# Patient Record
Sex: FEMALE | Race: WHITE | Employment: OTHER | URBAN - METROPOLITAN AREA
[De-identification: names, ages, dates, MRNs, and addresses within clinical notes are randomized per-mention and may not be internally consistent; named-entity substitution may affect disease eponyms.]

---

## 2017-02-04 ENCOUNTER — APPOINTMENT (OUTPATIENT)
Dept: LAB | Age: 67
End: 2017-02-04
Attending: FAMILY MEDICINE
Payer: COMMERCIAL

## 2017-02-04 DIAGNOSIS — M81.0 SENILE OSTEOPOROSIS: ICD-10-CM

## 2017-02-04 LAB
25-HYDROXYVITAMIN D (TOTAL): 39.1 NG/ML (ref 30–100)
BUN BLD-MCNC: 16 MG/DL (ref 8–20)
CALCIUM BLD-MCNC: 9.4 MG/DL (ref 8.3–10.3)
CHLORIDE: 106 MMOL/L (ref 101–111)
CO2: 28 MMOL/L (ref 22–32)
CREAT BLD-MCNC: 1.12 MG/DL (ref 0.55–1.02)
GLUCOSE BLD-MCNC: 136 MG/DL (ref 70–99)
POTASSIUM SERPL-SCNC: 4.4 MMOL/L (ref 3.6–5.1)
SODIUM SERPL-SCNC: 139 MMOL/L (ref 136–144)

## 2017-02-04 PROCEDURE — 82306 VITAMIN D 25 HYDROXY: CPT

## 2017-02-04 PROCEDURE — 36415 COLL VENOUS BLD VENIPUNCTURE: CPT

## 2017-02-04 PROCEDURE — 80048 BASIC METABOLIC PNL TOTAL CA: CPT

## 2017-02-06 NOTE — PROGRESS NOTES
Quick Note:    Telephone Information:  Mobile [phone]    LVM of Dr. Judith Arndt result note. To call if any questions.  Ok per [employer].           ______

## 2017-02-14 ENCOUNTER — OFFICE VISIT (OUTPATIENT)
Dept: HEMATOLOGY/ONCOLOGY | Facility: HOSPITAL | Age: 67
End: 2017-02-14
Attending: INTERNAL MEDICINE
Payer: COMMERCIAL

## 2017-02-14 DIAGNOSIS — M81.0 SENILE OSTEOPOROSIS: Primary | ICD-10-CM

## 2017-02-14 PROCEDURE — 96372 THER/PROPH/DIAG INJ SC/IM: CPT

## 2017-02-14 NOTE — PROGRESS NOTES
Education Record    Learner:  Patient    Disease / Diagnosis:Senile osteoporosis    Barriers / Limitations:  None   Comments:    Method:  Brief focused   Comments:    General Topics:  Infection, Medication, Pain, Precautions, Procedure, Side effects and sy

## 2017-06-07 ENCOUNTER — TELEPHONE (OUTPATIENT)
Dept: FAMILY MEDICINE CLINIC | Facility: CLINIC | Age: 67
End: 2017-06-07

## 2017-06-07 NOTE — TELEPHONE ENCOUNTER
Patient states that each time she goes to the bathroom it is diarrhea and it is bloody. Patient also has vomitted.  Please advise office visit here or with gastroenterologist.

## 2017-06-09 ENCOUNTER — LAB ENCOUNTER (OUTPATIENT)
Dept: LAB | Facility: HOSPITAL | Age: 67
End: 2017-06-09
Attending: PHYSICIAN ASSISTANT
Payer: COMMERCIAL

## 2017-06-09 DIAGNOSIS — K92.1 BLOOD IN STOOL: ICD-10-CM

## 2017-06-09 DIAGNOSIS — R19.7 DIARRHEA, UNSPECIFIED TYPE: ICD-10-CM

## 2017-06-09 DIAGNOSIS — R10.9 ABDOMINAL PAIN, UNSPECIFIED LOCATION: ICD-10-CM

## 2017-06-09 DIAGNOSIS — R50.9 FEVER, UNSPECIFIED FEVER CAUSE: ICD-10-CM

## 2017-06-09 PROCEDURE — 36415 COLL VENOUS BLD VENIPUNCTURE: CPT

## 2017-06-09 PROCEDURE — 80053 COMPREHEN METABOLIC PANEL: CPT

## 2017-06-09 PROCEDURE — 85025 COMPLETE CBC W/AUTO DIFF WBC: CPT

## 2017-06-10 ENCOUNTER — HOSPITAL ENCOUNTER (OUTPATIENT)
Dept: CT IMAGING | Age: 67
Discharge: HOME | End: 2017-06-10
Attending: PHYSICIAN ASSISTANT
Payer: COMMERCIAL

## 2017-06-10 DIAGNOSIS — R10.9 ABDOMINAL PAIN, UNSPECIFIED LOCATION: ICD-10-CM

## 2017-06-10 DIAGNOSIS — R50.9 FEVER, UNSPECIFIED FEVER CAUSE: ICD-10-CM

## 2017-06-10 DIAGNOSIS — R19.7 DIARRHEA, UNSPECIFIED TYPE: ICD-10-CM

## 2017-06-10 DIAGNOSIS — K92.1 BLOOD IN STOOL: ICD-10-CM

## 2017-06-10 PROCEDURE — 74177 CT ABD & PELVIS W/CONTRAST: CPT | Performed by: PHYSICIAN ASSISTANT

## 2017-06-19 ENCOUNTER — OFFICE VISIT (OUTPATIENT)
Dept: FAMILY MEDICINE CLINIC | Facility: CLINIC | Age: 67
End: 2017-06-19

## 2017-06-19 VITALS
HEART RATE: 88 BPM | DIASTOLIC BLOOD PRESSURE: 74 MMHG | BODY MASS INDEX: 27.85 KG/M2 | WEIGHT: 157.19 LBS | RESPIRATION RATE: 16 BRPM | TEMPERATURE: 99 F | HEIGHT: 63 IN | SYSTOLIC BLOOD PRESSURE: 128 MMHG

## 2017-06-19 DIAGNOSIS — R93.2 ABNORMAL CT OF LIVER: ICD-10-CM

## 2017-06-19 DIAGNOSIS — N18.2 CHRONIC KIDNEY DISEASE, STAGE II (MILD): ICD-10-CM

## 2017-06-19 DIAGNOSIS — A02.0 COLITIS DUE TO SALMONELLA SPECIES: ICD-10-CM

## 2017-06-19 DIAGNOSIS — K21.9 GASTROESOPHAGEAL REFLUX DISEASE WITHOUT ESOPHAGITIS: ICD-10-CM

## 2017-06-19 DIAGNOSIS — K25.7 CHRONIC GASTRIC ULCER WITHOUT HEMORRHAGE AND WITHOUT PERFORATION: ICD-10-CM

## 2017-06-19 DIAGNOSIS — D25.9 UTERINE LEIOMYOMA, UNSPECIFIED LOCATION: ICD-10-CM

## 2017-06-19 DIAGNOSIS — Z01.818 PREOPERATIVE GENERAL PHYSICAL EXAMINATION: Primary | ICD-10-CM

## 2017-06-19 PROCEDURE — 99243 OFF/OP CNSLTJ NEW/EST LOW 30: CPT | Performed by: FAMILY MEDICINE

## 2017-06-19 NOTE — PATIENT INSTRUCTIONS
Salmonella Infection (Salmonellosis)  Salmonella infection is also called salmonellosis. It's an illness that affects your intestines caused by Salmonella bacteria. You can be infected from eating or drinking contaminated food or water.  Beef, pork, chick · Signs of dehydration (dry, sticky mouth; decreased urine output; very dark urine)   Preventing Salmonella infection  Follow these steps to reduce your chances of getting or passing on Salmonella infection:  · Wash your hands well with soap and warm water

## 2017-06-25 ENCOUNTER — HOSPITAL ENCOUNTER (EMERGENCY)
Age: 67
Discharge: HOME | End: 2017-06-25
Payer: COMMERCIAL

## 2017-06-25 VITALS
OXYGEN SATURATION: 100 % | DIASTOLIC BLOOD PRESSURE: 79 MMHG | BODY MASS INDEX: 26.58 KG/M2 | TEMPERATURE: 98 F | HEART RATE: 73 BPM | WEIGHT: 150 LBS | HEIGHT: 63 IN | RESPIRATION RATE: 18 BRPM | SYSTOLIC BLOOD PRESSURE: 142 MMHG

## 2017-06-25 DIAGNOSIS — L24.7 IRRITANT CONTACT DERMATITIS DUE TO PLANTS, EXCEPT FOOD: Primary | ICD-10-CM

## 2017-06-25 PROCEDURE — 99283 EMERGENCY DEPT VISIT LOW MDM: CPT

## 2017-06-25 RX ORDER — PREDNISONE 20 MG/1
60 TABLET ORAL ONCE
Status: COMPLETED | OUTPATIENT
Start: 2017-06-25 | End: 2017-06-25

## 2017-06-25 RX ORDER — PREDNISONE 20 MG/1
TABLET ORAL
Qty: 25 TABLET | Refills: 0 | Status: SHIPPED | OUTPATIENT
Start: 2017-06-25 | End: 2017-12-21 | Stop reason: ALTCHOICE

## 2017-06-25 NOTE — ED INITIAL ASSESSMENT (HPI)
Pt c/o bilateral arms and chest rash that occurred yesterday after, \"being outside pulling weeds all day\". + itching.

## 2017-06-28 ENCOUNTER — PATIENT OUTREACH (OUTPATIENT)
Dept: FAMILY MEDICINE CLINIC | Facility: CLINIC | Age: 67
End: 2017-06-28

## 2017-07-05 ENCOUNTER — ANESTHESIA EVENT (OUTPATIENT)
Dept: MRI IMAGING | Facility: HOSPITAL | Age: 67
End: 2017-07-05
Payer: COMMERCIAL

## 2017-07-06 ENCOUNTER — ANESTHESIA (OUTPATIENT)
Dept: MRI IMAGING | Facility: HOSPITAL | Age: 67
End: 2017-07-06
Payer: COMMERCIAL

## 2017-07-06 ENCOUNTER — HOSPITAL ENCOUNTER (OUTPATIENT)
Dept: MRI IMAGING | Facility: HOSPITAL | Age: 67
Discharge: HOME | End: 2017-07-06
Attending: INTERNAL MEDICINE
Payer: COMMERCIAL

## 2017-07-06 ENCOUNTER — APPOINTMENT (OUTPATIENT)
Dept: LAB | Facility: HOSPITAL | Age: 67
End: 2017-07-06
Attending: INTERNAL MEDICINE
Payer: COMMERCIAL

## 2017-07-06 VITALS
DIASTOLIC BLOOD PRESSURE: 75 MMHG | WEIGHT: 150 LBS | SYSTOLIC BLOOD PRESSURE: 152 MMHG | HEIGHT: 63 IN | TEMPERATURE: 98 F | HEART RATE: 60 BPM | RESPIRATION RATE: 16 BRPM | OXYGEN SATURATION: 97 % | BODY MASS INDEX: 26.58 KG/M2

## 2017-07-06 DIAGNOSIS — R93.89 ABNORMAL FINDINGS ON IMAGING TEST: ICD-10-CM

## 2017-07-06 PROCEDURE — 74183 MRI ABD W/O CNTR FLWD CNTR: CPT | Performed by: INTERNAL MEDICINE

## 2017-07-06 PROCEDURE — A9581 GADOXETATE DISODIUM INJ: HCPCS | Performed by: INTERNAL MEDICINE

## 2017-07-06 RX ORDER — NALOXONE HYDROCHLORIDE 0.4 MG/ML
80 INJECTION, SOLUTION INTRAMUSCULAR; INTRAVENOUS; SUBCUTANEOUS AS NEEDED
Status: ACTIVE | OUTPATIENT
Start: 2017-07-06 | End: 2017-07-06

## 2017-07-06 RX ORDER — ACETAMINOPHEN 500 MG
1000 TABLET ORAL ONCE AS NEEDED
Status: ACTIVE | OUTPATIENT
Start: 2017-07-06 | End: 2017-07-06

## 2017-07-06 RX ORDER — METOCLOPRAMIDE HYDROCHLORIDE 5 MG/ML
10 INJECTION INTRAMUSCULAR; INTRAVENOUS AS NEEDED
Status: ACTIVE | OUTPATIENT
Start: 2017-07-06 | End: 2017-07-06

## 2017-07-06 RX ORDER — ONDANSETRON 2 MG/ML
4 INJECTION INTRAMUSCULAR; INTRAVENOUS AS NEEDED
Status: ACTIVE | OUTPATIENT
Start: 2017-07-06 | End: 2017-07-06

## 2017-07-06 RX ORDER — DEXAMETHASONE SODIUM PHOSPHATE 4 MG/ML
4 VIAL (ML) INJECTION AS NEEDED
Status: ACTIVE | OUTPATIENT
Start: 2017-07-06 | End: 2017-07-06

## 2017-07-06 RX ORDER — SODIUM CHLORIDE, SODIUM LACTATE, POTASSIUM CHLORIDE, CALCIUM CHLORIDE 600; 310; 30; 20 MG/100ML; MG/100ML; MG/100ML; MG/100ML
INJECTION, SOLUTION INTRAVENOUS CONTINUOUS
Status: DISCONTINUED | OUTPATIENT
Start: 2017-07-06 | End: 2017-07-10

## 2017-07-06 RX ORDER — MIDAZOLAM HYDROCHLORIDE 1 MG/ML
1 INJECTION INTRAMUSCULAR; INTRAVENOUS EVERY 5 MIN PRN
Status: ACTIVE | OUTPATIENT
Start: 2017-07-06 | End: 2017-07-06

## 2017-07-06 RX ORDER — HYDROMORPHONE HYDROCHLORIDE 1 MG/ML
0.4 INJECTION, SOLUTION INTRAMUSCULAR; INTRAVENOUS; SUBCUTANEOUS EVERY 5 MIN PRN
Status: ACTIVE | OUTPATIENT
Start: 2017-07-06 | End: 2017-07-06

## 2017-07-06 NOTE — ANESTHESIA POSTPROCEDURE EVALUATION
[address] Patient Status:  Outpatient   Age/Gender 74year old female MRN [id number]   [facility] MRI Attending Camille Arellano,    Hosp Day # 0 PCP Benoit Owen,        Anesthesia Post-op Note    * No proc

## 2017-07-06 NOTE — ANESTHESIA PREPROCEDURE EVALUATION
PRE-OP EVALUATION    Patient Name: Sg Joseph    Pre-op Diagnosis: * No pre-op diagnosis entered *    * No procedures listed *    * No surgeons found in log *    Pre-op vitals reviewed. Body mass index is 26.57 kg/m².     Current medications ARTHROPLASTY USING APL                TENDON TRANSFER RECONSTRUCTION;  Surgeon:                Sp Mcleod MD;  Location: [place] [address]  4/7/2015: FLUOROSCOPE EXAMINATION Left      Comment: Procedure: REMOVAL HARDWARE UPPER EXTREMITY; Plan: general  NPO status verified and patient meets guidelines. Post-procedure pain management plan discussed with surgeon and patient.     Comment: Risks and benefits of GA explained including but not limited to aspiration, mouth/dental/airway injury

## 2017-07-06 NOTE — IMAGING NOTE
Pt here, accompanied by  Jasmin Corral, [phone], for MRI with sedation. NPO since last evening around 1800, took morning meds with sips of water at 0530. IV started with slight swelling/hematoma at site.  Flushed site several times with fluid with no

## 2017-08-22 ENCOUNTER — MED REC SCAN ONLY (OUTPATIENT)
Dept: FAMILY MEDICINE CLINIC | Facility: CLINIC | Age: 67
End: 2017-08-22

## 2017-08-26 ENCOUNTER — LAB ENCOUNTER (OUTPATIENT)
Dept: LAB | Age: 67
End: 2017-08-26
Attending: INTERNAL MEDICINE
Payer: COMMERCIAL

## 2017-08-26 ENCOUNTER — HOSPITAL ENCOUNTER (OUTPATIENT)
Dept: BONE DENSITY | Age: 67
Discharge: HOME | End: 2017-08-26
Attending: INTERNAL MEDICINE
Payer: COMMERCIAL

## 2017-08-26 DIAGNOSIS — M81.0 SENILE OSTEOPOROSIS: ICD-10-CM

## 2017-08-26 LAB
25-HYDROXYVITAMIN D (TOTAL): 47.6 NG/ML (ref 30–100)
BUN BLD-MCNC: 16 MG/DL (ref 8–20)
CALCIUM BLD-MCNC: 9.1 MG/DL (ref 8.3–10.3)
CHLORIDE: 108 MMOL/L (ref 101–111)
CO2: 30 MMOL/L (ref 22–32)
CREAT BLD-MCNC: 1.04 MG/DL (ref 0.55–1.02)
GLUCOSE BLD-MCNC: 98 MG/DL (ref 70–99)
POTASSIUM SERPL-SCNC: 4.2 MMOL/L (ref 3.6–5.1)
SODIUM SERPL-SCNC: 141 MMOL/L (ref 136–144)

## 2017-08-26 PROCEDURE — 36415 COLL VENOUS BLD VENIPUNCTURE: CPT

## 2017-08-26 PROCEDURE — 77080 DXA BONE DENSITY AXIAL: CPT | Performed by: INTERNAL MEDICINE

## 2017-08-26 PROCEDURE — 82306 VITAMIN D 25 HYDROXY: CPT

## 2017-08-26 PROCEDURE — 80048 BASIC METABOLIC PNL TOTAL CA: CPT

## 2017-08-28 NOTE — PROGRESS NOTES
Dr. Chris Manrique sent patient a [employer] message regarding results, and patient already viewed it.

## 2017-08-28 NOTE — PROGRESS NOTES
Dr. Jann Painter sent patient a "[employer] message regarding results, and patient already viewed it.

## 2017-08-31 ENCOUNTER — TELEPHONE (OUTPATIENT)
Dept: HEMATOLOGY/ONCOLOGY | Facility: HOSPITAL | Age: 67
End: 2017-08-31

## 2017-09-08 ENCOUNTER — OFFICE VISIT (OUTPATIENT)
Dept: HEMATOLOGY/ONCOLOGY | Facility: HOSPITAL | Age: 67
End: 2017-09-08
Attending: INTERNAL MEDICINE
Payer: COMMERCIAL

## 2017-09-08 DIAGNOSIS — M81.0 SENILE OSTEOPOROSIS: Primary | ICD-10-CM

## 2017-09-08 LAB
ALBUMIN SERPL-MCNC: 3.9 G/DL (ref 3.5–4.8)
CALCIUM BLD-MCNC: 9.7 MG/DL (ref 8.3–10.3)
CREAT BLD-MCNC: 1.11 MG/DL (ref 0.55–1.02)
HAV IGM SER QL: 2.1 MG/DL (ref 1.7–3)
PHOSPHATE SERPL-MCNC: 2.9 MG/DL (ref 2.5–4.9)

## 2017-09-08 PROCEDURE — 82565 ASSAY OF CREATININE: CPT

## 2017-09-08 PROCEDURE — 82040 ASSAY OF SERUM ALBUMIN: CPT

## 2017-09-08 PROCEDURE — 83735 ASSAY OF MAGNESIUM: CPT

## 2017-09-08 PROCEDURE — 82310 ASSAY OF CALCIUM: CPT

## 2017-09-08 PROCEDURE — 84100 ASSAY OF PHOSPHORUS: CPT

## 2017-09-08 PROCEDURE — 36415 COLL VENOUS BLD VENIPUNCTURE: CPT

## 2017-09-12 NOTE — PROGRESS NOTES
Noted. Please see TE dated 9/8/17.  Patient is already scheduled to receive Prolia on 9/13.    9/13/2017  1:30 PM    [address] [place] [address]  9/21/2017  1:00 PM    Joshua Patel MD MS[facility] [address]   MSK DG  8/15/2018  11

## 2017-09-13 ENCOUNTER — OFFICE VISIT (OUTPATIENT)
Dept: HEMATOLOGY/ONCOLOGY | Facility: HOSPITAL | Age: 67
End: 2017-09-13
Attending: INTERNAL MEDICINE
Payer: COMMERCIAL

## 2017-09-13 DIAGNOSIS — M81.0 SENILE OSTEOPOROSIS: Primary | ICD-10-CM

## 2017-09-13 PROCEDURE — 96372 THER/PROPH/DIAG INJ SC/IM: CPT

## 2018-09-10 ENCOUNTER — OFFICE VISIT (OUTPATIENT)
Dept: FAMILY MEDICINE CLINIC | Facility: CLINIC | Age: 68
End: 2018-09-10
Payer: MEDICARE

## 2018-09-10 VITALS
DIASTOLIC BLOOD PRESSURE: 70 MMHG | HEIGHT: 63.5 IN | HEART RATE: 78 BPM | BODY MASS INDEX: 27.12 KG/M2 | RESPIRATION RATE: 16 BRPM | WEIGHT: 155 LBS | SYSTOLIC BLOOD PRESSURE: 122 MMHG | TEMPERATURE: 98 F

## 2018-09-10 DIAGNOSIS — Z12.31 ENCOUNTER FOR SCREENING MAMMOGRAM FOR BREAST CANCER: ICD-10-CM

## 2018-09-10 DIAGNOSIS — R21 SKIN RASH: Primary | ICD-10-CM

## 2018-09-10 DIAGNOSIS — Z13.0 SCREENING FOR ENDOCRINE, METABOLIC AND IMMUNITY DISORDER: ICD-10-CM

## 2018-09-10 DIAGNOSIS — Z13.29 SCREENING FOR ENDOCRINE, METABOLIC AND IMMUNITY DISORDER: ICD-10-CM

## 2018-09-10 DIAGNOSIS — S39.012A STRAIN OF LUMBAR REGION, INITIAL ENCOUNTER: ICD-10-CM

## 2018-09-10 DIAGNOSIS — L25.9 CONTACT DERMATITIS, UNSPECIFIED CONTACT DERMATITIS TYPE, UNSPECIFIED TRIGGER: ICD-10-CM

## 2018-09-10 DIAGNOSIS — Z13.228 SCREENING FOR ENDOCRINE, METABOLIC AND IMMUNITY DISORDER: ICD-10-CM

## 2018-09-10 PROCEDURE — 99214 OFFICE O/P EST MOD 30 MIN: CPT | Performed by: FAMILY MEDICINE

## 2018-09-10 RX ORDER — METHYLPREDNISOLONE 4 MG/1
TABLET ORAL
Qty: 1 KIT | Refills: 0 | Status: SHIPPED | OUTPATIENT
Start: 2018-09-10 | End: 2018-10-01 | Stop reason: ALTCHOICE

## 2018-09-10 NOTE — PATIENT INSTRUCTIONS
Thank you for choosing Catarina Faria MD at [place] [address]  To Do: Gigi Dewitt  1. Please take meds as directed. [facility] is located in [address]. Monday, Tuesday & Friday – 8 a.m. to 4 p.m.   Wednesday, Thursday – 7 a.m. to 3 outweigh those potential risks and we strive to make you healthier and to improve your quality of life.     Referrals, and Radiology Information:    If your insurance requires a referral to a specialist, please allow 5 business days to process your referral

## 2018-09-10 NOTE — PROGRESS NOTES
HPI:    Patient ID: Kay Saldivar is a 79year old female. HPI  Ms. Umang Sanabria is a pleasant 73-year-old female with history of Montaño's esophagus, GERD, osteoarthritis, osteopenia here today to establish care with me.   She however has a rash on he Multiple Vitamin (MULTI-VITAMIN DAILY OR) Take 1 tablet by mouth daily. Disp:  Rfl:    Ascorbic Acid (VITAMIN C OR) Take 1 tablet by mouth daily. Disp:  Rfl:    Calcium Carbonate (CALCIUM 600 OR) Take 1 tablet by mouth daily.    Disp:  Rfl:    Denosum dermatitis type, unspecified trigger  -I assured her that this should resolve by itself but probably Medrol Dosepak will help    Follow-up for next wellness exam or as needed  Orders Placed This Encounter      CBC With Differential With Platelet      Comp

## 2018-09-17 ENCOUNTER — TELEPHONE (OUTPATIENT)
Dept: FAMILY MEDICINE CLINIC | Facility: CLINIC | Age: 68
End: 2018-09-17

## 2018-09-17 RX ORDER — CYCLOBENZAPRINE HCL 5 MG
5 TABLET ORAL 2 TIMES DAILY PRN
Qty: 45 TABLET | Refills: 0 | Status: SHIPPED | OUTPATIENT
Start: 2018-09-17 | End: 2018-10-11

## 2018-09-17 RX ORDER — GABAPENTIN 100 MG/1
100 CAPSULE ORAL NIGHTLY
Qty: 30 CAPSULE | Refills: 0 | Status: SHIPPED | OUTPATIENT
Start: 2018-09-17 | End: 2018-10-10

## 2018-09-17 NOTE — TELEPHONE ENCOUNTER
Patient reports that sciatic nerve pain is worse, she can hardly sit at work, states her poison ivy is gone. Pain 8/10. Right side pain radiates to right leg. Denies any numbness. Pt completed medrol dose pal, but pain is still there.  PT uses Meloxicam 15m

## 2018-09-17 NOTE — TELEPHONE ENCOUNTER
Attempted to reach patient at both numbers left, no answer, left message to call back to discuss symptoms

## 2018-09-17 NOTE — TELEPHONE ENCOUNTER
I personally spoke with pt. I will start her on gabapentin 100mg qhs for the nerve pain, but can take 1-2 wks for a good difference to be noticed.  In the meantime I can write a script for cyclobenzaprine bid prn, to f/u with Dr. Ronaldo Brantley to see how

## 2018-09-18 ENCOUNTER — TELEPHONE (OUTPATIENT)
Dept: FAMILY MEDICINE CLINIC | Facility: CLINIC | Age: 68
End: 2018-09-18

## 2018-09-18 NOTE — TELEPHONE ENCOUNTER
Received fax from [employer] stating a prior Vernette Oiler is   required for the Cyclobenzaprine. Patients ID # [id number],  Call [phone] to initiate PA. PA submitted via CM today & awaiting a response.   Your information has been submitted to [facility]

## 2018-09-18 NOTE — TELEPHONE ENCOUNTER
Pt was seen by Dr. Guille Early this past week and was given medications to help with the low back pain. Dr. Jakub Moore called in  gabapentin 100 MG Oral Cap and Cyclobenzaprine HCl 5 MG . Patient is not getting any relief. Please advise.

## 2018-09-18 NOTE — TELEPHONE ENCOUNTER
Patient states she would like to just schedule an appt and discuss with MD her symptoms.  Appt scheduled for tomorrow    Future Appointments   Date Time Provider Yefri Reagan   9/19/2018  1:30 PM Kael Gregorio MD [facility] [address] [facility]

## 2018-09-19 ENCOUNTER — OFFICE VISIT (OUTPATIENT)
Dept: FAMILY MEDICINE CLINIC | Facility: CLINIC | Age: 68
End: 2018-09-19
Payer: MEDICARE

## 2018-09-19 ENCOUNTER — TELEPHONE (OUTPATIENT)
Dept: FAMILY MEDICINE CLINIC | Facility: CLINIC | Age: 68
End: 2018-09-19

## 2018-09-19 ENCOUNTER — HOSPITAL ENCOUNTER (OUTPATIENT)
Dept: GENERAL RADIOLOGY | Age: 68
Discharge: HOME | End: 2018-09-19
Attending: FAMILY MEDICINE
Payer: MEDICARE

## 2018-09-19 VITALS
HEIGHT: 63.5 IN | WEIGHT: 158 LBS | HEART RATE: 80 BPM | SYSTOLIC BLOOD PRESSURE: 130 MMHG | RESPIRATION RATE: 16 BRPM | BODY MASS INDEX: 27.65 KG/M2 | DIASTOLIC BLOOD PRESSURE: 80 MMHG | TEMPERATURE: 98 F

## 2018-09-19 DIAGNOSIS — Z23 NEED FOR INFLUENZA VACCINATION: ICD-10-CM

## 2018-09-19 DIAGNOSIS — M54.40 BACK PAIN OF LUMBAR REGION WITH SCIATICA: Primary | ICD-10-CM

## 2018-09-19 DIAGNOSIS — M54.40 BACK PAIN OF LUMBAR REGION WITH SCIATICA: ICD-10-CM

## 2018-09-19 PROCEDURE — G0008 ADMIN INFLUENZA VIRUS VAC: HCPCS | Performed by: FAMILY MEDICINE

## 2018-09-19 PROCEDURE — 99213 OFFICE O/P EST LOW 20 MIN: CPT | Performed by: FAMILY MEDICINE

## 2018-09-19 PROCEDURE — 90653 IIV ADJUVANT VACCINE IM: CPT | Performed by: FAMILY MEDICINE

## 2018-09-19 PROCEDURE — 72110 X-RAY EXAM L-2 SPINE 4/>VWS: CPT | Performed by: FAMILY MEDICINE

## 2018-09-19 RX ORDER — TRAMADOL HYDROCHLORIDE 50 MG/1
50 TABLET ORAL 2 TIMES DAILY PRN
Qty: 60 TABLET | Refills: 0 | Status: SHIPPED | OUTPATIENT
Start: 2018-09-19 | End: 2018-10-11

## 2018-09-19 NOTE — PROGRESS NOTES
[facility] Visit Note  9/19/2018      Subjective:      Patient ID: Kashmir Watkins is a 79year old female.     Chief Complaint:  Patient presents with:  Low Back Pain: Follow up on  right sided low back pain that radiates down her into her ri TempSrc: Temporal   Weight: 158 lb   Height: 63.5\"       Physical Examination   General:  Alert, in no acute distress  HEENT: NCAT, EOMI, mucus membranes moist   Neck:  No cervical lymphadenopathy  CV: Regular rate and rhythm.  No murmurs, gallops, or ru

## 2018-09-19 NOTE — TELEPHONE ENCOUNTER
Spoke to pharmacist and informed that patient was given print out of RX, she will contact the patient and inform her to check her paperwork. Disp Refills Start End     TraMADol HCl 50 MG Oral Tab 60 tablet 0 9/19/2018     Sig - Route:  Take 1 tablet (50

## 2018-09-19 NOTE — TELEPHONE ENCOUNTER
Pharnacy states spouse is at the pharmacy to  rx for  TraMADol HCl 50 MG Oral Tab 60 tablet 0 9/19/2018     Sig - Route:  Take 1 tablet (50 mg total) by mouth 2 (two) times daily as needed for Pain. - [address]

## 2018-09-20 PROBLEM — M47.816 LUMBAR FACET ARTHROPATHY: Status: ACTIVE | Noted: 2018-09-01

## 2018-09-20 PROBLEM — M43.16 SPONDYLOLISTHESIS, LUMBAR REGION: Status: ACTIVE | Noted: 2018-09-01

## 2018-09-24 ENCOUNTER — TELEPHONE (OUTPATIENT)
Dept: FAMILY MEDICINE CLINIC | Facility: CLINIC | Age: 68
End: 2018-09-24

## 2018-09-24 ENCOUNTER — OFFICE VISIT (OUTPATIENT)
Dept: PHYSICAL THERAPY | Age: 68
End: 2018-09-24
Attending: FAMILY MEDICINE
Payer: MEDICARE

## 2018-09-24 DIAGNOSIS — M54.40 BACK PAIN OF LUMBAR REGION WITH SCIATICA: ICD-10-CM

## 2018-09-24 DIAGNOSIS — M43.16 SPONDYLOLISTHESIS OF LUMBAR REGION: Primary | ICD-10-CM

## 2018-09-24 DIAGNOSIS — M54.31 SCIATICA OF RIGHT SIDE: ICD-10-CM

## 2018-09-24 PROCEDURE — 97140 MANUAL THERAPY 1/> REGIONS: CPT

## 2018-09-24 PROCEDURE — 97162 PT EVAL MOD COMPLEX 30 MIN: CPT

## 2018-09-24 NOTE — TELEPHONE ENCOUNTER
Patient informed of MD recommendations, patient verbalized understanding with intent to comply. Offered opportunity to ask questions, all questions were answered. Information to central scheduling provided to patient.      Future Appointments   Date Time Pro

## 2018-09-24 NOTE — PROGRESS NOTES
SPINE EVALUATION:   Referring Physician: Dr. Dustin Turcios  Diagnosis: Back pain of lumbar region with sciatica (M54.40)  Date of Service: 9/24/2018     PATIENT Rebecca Manning is a 79year old y/o female who presents to therapy today with compl transitional movements as well as functional activities (as listed above) difficult. Evaluation findings are consistent with lumbar radiculopathy.   Gigi would benefit from skilled Physical Therapy to address the above impairments and to work on goals 12 min     Total Treatment Time: 48 min Based on the clinical presentation, examination and history, this evaluation shows involvement of 3-4 body structures involved / activity limitations, 1-2 personal factors / comorbidities and the condition is evolvin restricted      Patient/Family/Caregiver was advised of these findings, precautions, and treatment options and has agreed to actively participate in planning and for this course of care.     Thank you for your referral. Please co-sign or sign and return thi

## 2018-09-24 NOTE — TELEPHONE ENCOUNTER
If she feels she is in too much pain to tolerate PT, let's get an MRI lumbar spine, this will help us to determine if she is a surgical candidate/which referral is appropriate and they would want this done before seeing pt.  For now, refer her to pain manag

## 2018-09-24 NOTE — TELEPHONE ENCOUNTER
Called patient back to discuss. Patient states she has tried the tramadol, cyclobenzaprine and gabapentin. In addition she has used ibuprofen and heat to area. She states none of these interventions have provided her relief.  Patient has not started PT but

## 2018-09-24 NOTE — TELEPHONE ENCOUNTER
Patient states the Tramadol is not working for her pain for pinched nerve, she can hardly walk, she has PT tonight, please advise.

## 2018-09-26 ENCOUNTER — TELEPHONE (OUTPATIENT)
Dept: FAMILY MEDICINE CLINIC | Facility: CLINIC | Age: 68
End: 2018-09-26

## 2018-09-26 ENCOUNTER — OFFICE VISIT (OUTPATIENT)
Dept: PHYSICAL THERAPY | Age: 68
End: 2018-09-26
Attending: FAMILY MEDICINE
Payer: MEDICARE

## 2018-09-26 DIAGNOSIS — M43.16 SPONDYLOLISTHESIS OF LUMBAR REGION: Primary | ICD-10-CM

## 2018-09-26 DIAGNOSIS — M54.40 BACK PAIN OF LUMBAR REGION WITH SCIATICA: ICD-10-CM

## 2018-09-26 DIAGNOSIS — M54.31 SCIATICA OF RIGHT SIDE: ICD-10-CM

## 2018-09-26 PROCEDURE — 97112 NEUROMUSCULAR REEDUCATION: CPT

## 2018-09-26 PROCEDURE — 97140 MANUAL THERAPY 1/> REGIONS: CPT

## 2018-09-26 PROCEDURE — 97110 THERAPEUTIC EXERCISES: CPT

## 2018-09-26 NOTE — TELEPHONE ENCOUNTER
Called patient to discuss if she has ever attempted open MRI. Patient states she has not but [facility] does not have a open MRI. RN notified patient [facility]. Patient states she does not want to reschedule her appt.  She would like IV sedation as t

## 2018-09-26 NOTE — PROGRESS NOTES
Dx:  Back pain of lumbar region with sciatica (M54.40)        Authorized # of Visits:  n/a       Next MD visit: none scheduled  Fall Risk: standard         Precautions: n/a             Subjective: Patient states that she is still getting the pain down the independent and compliant with comprehensive HEP to maintain progress achieved in PT.- In progress         Plan: Continue physical therapy to address above goals. Assess long term response to today's treatment.  Patient is having MRI of lumbar spine this Fr

## 2018-09-26 NOTE — TELEPHONE ENCOUNTER
Patient called - she is scheduled for a closed MRI on Friday evening. Imaging called her to see if she was clautrophobic, which she is, and they asked her to call her MD to prescribe anesthesia so she can be \"knocked out\".  She states that's what they had

## 2018-10-01 VITALS — BODY MASS INDEX: 26.58 KG/M2 | WEIGHT: 150 LBS | HEIGHT: 63 IN

## 2018-10-01 RX ORDER — SODIUM CHLORIDE, SODIUM LACTATE, POTASSIUM CHLORIDE, CALCIUM CHLORIDE 600; 310; 30; 20 MG/100ML; MG/100ML; MG/100ML; MG/100ML
INJECTION, SOLUTION INTRAVENOUS CONTINUOUS
Status: CANCELLED | OUTPATIENT
Start: 2018-10-01

## 2018-10-02 ENCOUNTER — TELEPHONE (OUTPATIENT)
Dept: PHYSICAL THERAPY | Age: 68
End: 2018-10-02

## 2018-10-03 ENCOUNTER — HOSPITAL ENCOUNTER (OUTPATIENT)
Dept: MRI IMAGING | Facility: HOSPITAL | Age: 68
Discharge: HOME | End: 2018-10-03
Attending: FAMILY MEDICINE
Payer: MEDICARE

## 2018-10-03 ENCOUNTER — APPOINTMENT (OUTPATIENT)
Dept: PHYSICAL THERAPY | Age: 68
End: 2018-10-03
Attending: FAMILY MEDICINE
Payer: MEDICARE

## 2018-10-03 DIAGNOSIS — M43.16 SPONDYLOLISTHESIS OF LUMBAR REGION: ICD-10-CM

## 2018-10-03 DIAGNOSIS — M54.31 SCIATICA OF RIGHT SIDE: ICD-10-CM

## 2018-10-03 PROCEDURE — 72148 MRI LUMBAR SPINE W/O DYE: CPT | Performed by: FAMILY MEDICINE

## 2018-10-04 DIAGNOSIS — G89.29 CHRONIC LOW BACK PAIN WITH SCIATICA, SCIATICA LATERALITY UNSPECIFIED, UNSPECIFIED BACK PAIN LATERALITY: ICD-10-CM

## 2018-10-04 DIAGNOSIS — M54.40 CHRONIC LOW BACK PAIN WITH SCIATICA, SCIATICA LATERALITY UNSPECIFIED, UNSPECIFIED BACK PAIN LATERALITY: ICD-10-CM

## 2018-10-04 DIAGNOSIS — M51.36 DEGENERATIVE DISC DISEASE, LUMBAR: Primary | ICD-10-CM

## 2018-10-04 DIAGNOSIS — M48.00 CENTRAL STENOSIS OF SPINAL CANAL: ICD-10-CM

## 2018-10-10 ENCOUNTER — TELEPHONE (OUTPATIENT)
Dept: PHYSICAL THERAPY | Age: 68
End: 2018-10-10

## 2018-10-10 ENCOUNTER — HOSPITAL ENCOUNTER (OUTPATIENT)
Dept: MRI IMAGING | Facility: HOSPITAL | Age: 68
Discharge: HOME | End: 2018-10-10
Attending: FAMILY MEDICINE
Payer: MEDICARE

## 2018-10-11 DIAGNOSIS — M54.40 BACK PAIN OF LUMBAR REGION WITH SCIATICA: ICD-10-CM

## 2018-10-11 RX ORDER — TRAMADOL HYDROCHLORIDE 50 MG/1
50 TABLET ORAL 2 TIMES DAILY PRN
Qty: 60 TABLET | Refills: 0 | Status: SHIPPED
Start: 2018-10-11 | End: 2018-12-11

## 2018-10-11 RX ORDER — CYCLOBENZAPRINE HCL 5 MG
5 TABLET ORAL 2 TIMES DAILY PRN
Qty: 45 TABLET | Refills: 0 | Status: SHIPPED | OUTPATIENT
Start: 2018-10-11 | End: 2018-11-06

## 2018-10-11 RX ORDER — GABAPENTIN 100 MG/1
CAPSULE ORAL
Qty: 90 CAPSULE | Refills: 0 | Status: SHIPPED | OUTPATIENT
Start: 2018-10-11 | End: 2018-12-11

## 2018-10-11 NOTE — TELEPHONE ENCOUNTER
Requesting Tramadol and Cyclobenzaprine  LOV: 9/19/18  RTC: 2 months  Last Relevant Labs: n/a  Filled: 9/19/18 #60 with 0 refills  Tramadol  Filled: 9/17/18 #45 with 0 refills Cyclobenzaprine    Tramadol last filled 9/19/18 #60 [place]

## 2018-10-11 NOTE — TELEPHONE ENCOUNTER
Patient called and requested 2 refills:  TraMADol HCl 50 MG Oral Tab 60 tablet     And    Cyclobenzaprine HCl 5 MG Oral Tab 45 tablets    She states they are going out of town and has enough to get her through the weekend but wants to make sure they can be

## 2018-10-15 ENCOUNTER — APPOINTMENT (OUTPATIENT)
Dept: PHYSICAL THERAPY | Age: 68
End: 2018-10-15
Attending: FAMILY MEDICINE
Payer: MEDICARE

## 2018-10-17 ENCOUNTER — APPOINTMENT (OUTPATIENT)
Dept: PHYSICAL THERAPY | Age: 68
End: 2018-10-17
Attending: FAMILY MEDICINE
Payer: MEDICARE

## 2018-10-22 ENCOUNTER — APPOINTMENT (OUTPATIENT)
Dept: PHYSICAL THERAPY | Age: 68
End: 2018-10-22
Attending: FAMILY MEDICINE
Payer: MEDICARE

## 2018-10-24 ENCOUNTER — APPOINTMENT (OUTPATIENT)
Dept: PHYSICAL THERAPY | Age: 68
End: 2018-10-24
Attending: FAMILY MEDICINE
Payer: MEDICARE

## 2018-10-29 ENCOUNTER — APPOINTMENT (OUTPATIENT)
Dept: PHYSICAL THERAPY | Age: 68
End: 2018-10-29
Attending: FAMILY MEDICINE
Payer: MEDICARE

## 2018-10-31 ENCOUNTER — APPOINTMENT (OUTPATIENT)
Dept: PHYSICAL THERAPY | Age: 68
End: 2018-10-31
Attending: FAMILY MEDICINE
Payer: MEDICARE

## 2018-11-06 RX ORDER — CYCLOBENZAPRINE HCL 5 MG
TABLET ORAL
Qty: 60 TABLET | Refills: 0 | Status: SHIPPED | OUTPATIENT
Start: 2018-11-06 | End: 2018-12-11

## 2018-11-06 NOTE — TELEPHONE ENCOUNTER
Requesting Cyclobenzaprine HCl 5 MG Oral Tab  LOV: 9/19/18  RTC: 2 mos  Last Labs: n.a  Filled: 10/11/18 #45 with 0 refills (takes BID)    Future Appointments   Date Time Provider Yefri Reagan   12/20/2018 10:15 AM Nathan Ordonez MD [facility] SUB

## 2018-12-07 ENCOUNTER — LAB ENCOUNTER (OUTPATIENT)
Dept: LAB | Age: 68
End: 2018-12-07
Attending: FAMILY MEDICINE
Payer: MEDICARE

## 2018-12-07 DIAGNOSIS — M81.0 SENILE OSTEOPOROSIS: ICD-10-CM

## 2018-12-07 DIAGNOSIS — Z13.0 SCREENING FOR ENDOCRINE, METABOLIC AND IMMUNITY DISORDER: ICD-10-CM

## 2018-12-07 DIAGNOSIS — Z13.29 SCREENING FOR ENDOCRINE, METABOLIC AND IMMUNITY DISORDER: ICD-10-CM

## 2018-12-07 DIAGNOSIS — Z13.228 SCREENING FOR ENDOCRINE, METABOLIC AND IMMUNITY DISORDER: ICD-10-CM

## 2018-12-07 PROCEDURE — 85025 COMPLETE CBC W/AUTO DIFF WBC: CPT

## 2018-12-07 PROCEDURE — 84443 ASSAY THYROID STIM HORMONE: CPT

## 2018-12-07 PROCEDURE — 82607 VITAMIN B-12: CPT

## 2018-12-07 PROCEDURE — 82306 VITAMIN D 25 HYDROXY: CPT

## 2018-12-07 PROCEDURE — 80053 COMPREHEN METABOLIC PANEL: CPT

## 2018-12-07 PROCEDURE — 80061 LIPID PANEL: CPT

## 2018-12-07 PROCEDURE — 36415 COLL VENOUS BLD VENIPUNCTURE: CPT

## 2018-12-08 ENCOUNTER — NURSE ONLY (OUTPATIENT)
Dept: FAMILY MEDICINE CLINIC | Facility: CLINIC | Age: 68
End: 2018-12-08
Payer: MEDICARE

## 2018-12-08 VITALS
TEMPERATURE: 98 F | HEIGHT: 63.5 IN | DIASTOLIC BLOOD PRESSURE: 82 MMHG | SYSTOLIC BLOOD PRESSURE: 136 MMHG | RESPIRATION RATE: 16 BRPM | HEART RATE: 77 BPM | BODY MASS INDEX: 25.34 KG/M2 | WEIGHT: 144.81 LBS | OXYGEN SATURATION: 98 %

## 2018-12-08 DIAGNOSIS — R39.9 UTI SYMPTOMS: ICD-10-CM

## 2018-12-08 DIAGNOSIS — N30.00 ACUTE CYSTITIS WITHOUT HEMATURIA: Primary | ICD-10-CM

## 2018-12-08 PROCEDURE — 99213 OFFICE O/P EST LOW 20 MIN: CPT | Performed by: PHYSICIAN ASSISTANT

## 2018-12-08 PROCEDURE — 87086 URINE CULTURE/COLONY COUNT: CPT | Performed by: PHYSICIAN ASSISTANT

## 2018-12-08 PROCEDURE — 87077 CULTURE AEROBIC IDENTIFY: CPT | Performed by: PHYSICIAN ASSISTANT

## 2018-12-08 PROCEDURE — 87186 SC STD MICRODIL/AGAR DIL: CPT | Performed by: PHYSICIAN ASSISTANT

## 2018-12-08 PROCEDURE — 81003 URINALYSIS AUTO W/O SCOPE: CPT | Performed by: PHYSICIAN ASSISTANT

## 2018-12-08 RX ORDER — CEPHALEXIN 500 MG/1
500 CAPSULE ORAL 2 TIMES DAILY
Qty: 14 CAPSULE | Refills: 0 | Status: SHIPPED | OUTPATIENT
Start: 2018-12-08 | End: 2018-12-15

## 2018-12-08 NOTE — PATIENT INSTRUCTIONS
1. Keflex  2. Urine culture sent  3. Increase fluids  4. Follow up with PCP  5.  If fever or worsening symptoms seek treatment    Understanding Urinary Tract Infections (UTIs)  Most UTIs are caused by bacteria, although they may also be caused by viruses

## 2018-12-08 NOTE — PROGRESS NOTES
CHIEF COMPLAINT:   Patient presents with:  Urinary Frequency: x 1 week  Burning On Urination: x 1 week      HPI:   Nichelle Martínez is a 79year old female who presents with symptoms of UTI.  The patient reports urinary frequency, urgency, and dysuria • Benign colon polyp 1/3/2011   • Chronic kidney disease, stage II (mild)     pt denies   • De Quervain's tenosynovitis    • Eczema     hand   • GERD (gastroesophageal reflux disease)    • Hiatal hernia    • Labyrinthitis    • Lumbar facet arthropathy 09/2 Multistix Lot# [id number] Numeric    Multistix Expiration Date 08/31/19 Date       Patient's Creatinine clearance 55 based on CMP completed yesterday. No dosage adjustment for Keflex.        ASSESSMENT AND PLAN:   Marylou Coles is a 79year old female · The ureters carry urine from the kidneys to the bladder. · The bladder holds urine until you are ready to let it out. · The urethra carries urine from the bladder out of the body.  It is shorter in women, so bacteria can move through it more easily. The

## 2018-12-10 NOTE — PROGRESS NOTES
Telephone Information:  Home Phone      [phone]  Work Phone      [phone]  Mobile          [phone] Jessie Weaver regarding Dr. Garcia Purchase result note. Hours and number given.

## 2018-12-11 NOTE — PROGRESS NOTES
[phone] (home) [phone] (work)  LVM of Dr. Maggie Obregon' result note. To call to schedule appointment. Phone number provided. Ok per [employer].

## 2018-12-20 PROBLEM — K21.00 GASTRO-ESOPHAGEAL REFLUX DISEASE WITH ESOPHAGITIS: Status: ACTIVE | Noted: 2018-12-20

## 2019-02-26 ENCOUNTER — TELEPHONE (OUTPATIENT)
Dept: FAMILY MEDICINE CLINIC | Facility: CLINIC | Age: 69
End: 2019-02-26

## 2019-03-07 ENCOUNTER — OFFICE VISIT (OUTPATIENT)
Dept: FAMILY MEDICINE CLINIC | Facility: CLINIC | Age: 69
End: 2019-03-07
Payer: MEDICARE

## 2019-03-07 VITALS
RESPIRATION RATE: 16 BRPM | SYSTOLIC BLOOD PRESSURE: 126 MMHG | BODY MASS INDEX: 26.25 KG/M2 | HEIGHT: 63.5 IN | TEMPERATURE: 98 F | WEIGHT: 150 LBS | DIASTOLIC BLOOD PRESSURE: 80 MMHG | HEART RATE: 76 BPM

## 2019-03-07 DIAGNOSIS — J01.00 ACUTE MAXILLARY SINUSITIS, RECURRENCE NOT SPECIFIED: Primary | ICD-10-CM

## 2019-03-07 PROCEDURE — 99213 OFFICE O/P EST LOW 20 MIN: CPT | Performed by: PHYSICIAN ASSISTANT

## 2019-03-07 RX ORDER — BENZONATATE 200 MG/1
200 CAPSULE ORAL 3 TIMES DAILY PRN
Qty: 30 CAPSULE | Refills: 0 | Status: SHIPPED | OUTPATIENT
Start: 2019-03-07 | End: 2019-08-12 | Stop reason: ALTCHOICE

## 2019-03-07 RX ORDER — AMOXICILLIN 875 MG/1
875 TABLET, COATED ORAL 2 TIMES DAILY
Qty: 20 TABLET | Refills: 0 | Status: SHIPPED | OUTPATIENT
Start: 2019-03-07 | End: 2019-08-12 | Stop reason: ALTCHOICE

## 2019-03-07 RX ORDER — IPRATROPIUM BROMIDE 21 UG/1
2 SPRAY, METERED NASAL EVERY 12 HOURS
Qty: 1 BOTTLE | Refills: 0 | Status: SHIPPED | OUTPATIENT
Start: 2019-03-07 | End: 2022-01-07

## 2019-03-07 NOTE — TELEPHONE ENCOUNTER
Ipratropium Bromide 0.03 % Nasal Solution  #60    [facility] [address] - [address] AT [address] [facility], [phone], [phone]

## 2019-03-07 NOTE — PATIENT INSTRUCTIONS
Thank you for choosing Kelly Gonzalez PA-C at [facility]  To Do: Gigi Dewitt  1. Pt to begin medications as prescribed  2. OTC Allegra or Zyrtec  3. If symptoms persist or increase pt to call office  4.  Schedule annual well visit    • called informing you that the insurance company approved your testing, please call Central Scheduling at [phone]  Please allow our office 5 business days to contact you regarding any testing results.     Refill policies:   Allow 3 business days for ref

## 2019-03-07 NOTE — PROGRESS NOTES
[facility] Internal Medicine Progress Note    CC:  Patient presents with:  Nasal Congestion: x 3 weeks  Cough  Irritation      HPI:   HPI  Nasal congestion, cough  Started 3 weeks ago with cough  + PND   Sinus pressure   Sore throat in the AM  Lo Neurological: Positive for headaches. Negative for dizziness and light-headedness. /80   Pulse 76   Temp 98.1 °F (36.7 °C) (Oral)   Resp 16   Ht 63.5\"   Wt 150 lb   BMI 26.15 kg/m²  Body mass index is 26.15 kg/m².   Physical Exam   [employer] Education: There are no barriers to learning. Medical education done. Outcome: Patient verbalizes understanding.     Problem List:  Patient Active Problem List:     GERD (gastroesophageal reflux disease)     De Quervain's tenosynovitis     Chronic kidney di

## 2019-03-11 RX ORDER — IPRATROPIUM BROMIDE 21 UG/1
2 SPRAY, METERED NASAL EVERY 12 HOURS
Qty: 1 BOTTLE | Refills: 0 | OUTPATIENT
Start: 2019-03-11

## 2019-08-12 ENCOUNTER — OFFICE VISIT (OUTPATIENT)
Dept: FAMILY MEDICINE CLINIC | Facility: CLINIC | Age: 69
End: 2019-08-12
Payer: MEDICARE

## 2019-08-12 VITALS
WEIGHT: 159 LBS | DIASTOLIC BLOOD PRESSURE: 70 MMHG | HEART RATE: 68 BPM | RESPIRATION RATE: 16 BRPM | SYSTOLIC BLOOD PRESSURE: 120 MMHG | HEIGHT: 63.5 IN | TEMPERATURE: 98 F | BODY MASS INDEX: 27.82 KG/M2

## 2019-08-12 DIAGNOSIS — G25.81 RESTLESS LEG SYNDROME: Primary | ICD-10-CM

## 2019-08-12 PROCEDURE — 99213 OFFICE O/P EST LOW 20 MIN: CPT | Performed by: FAMILY MEDICINE

## 2019-08-12 RX ORDER — LEVOCETIRIZINE DIHYDROCHLORIDE 5 MG/1
5 TABLET, FILM COATED ORAL EVERY EVENING
COMMUNITY

## 2019-08-12 RX ORDER — ROPINIROLE 0.25 MG/1
0.25 TABLET, FILM COATED ORAL NIGHTLY
Qty: 90 TABLET | Refills: 1 | Status: SHIPPED | OUTPATIENT
Start: 2019-08-12 | End: 2019-09-17

## 2019-08-12 NOTE — PATIENT INSTRUCTIONS
Thank you for choosing Sameer Chun MD at [place] [address]  To Do: Gigi Dewitt  1. Please take meds as directed. [facility] is located in [address]. Monday, Tuesday & Friday – 8 a.m. to 4 p.m.   Wednesday, Thursday – 7 a.m. to 3 • Please call our office about any questions regarding your treatment/medicines/tests as a result of today's visit.  For your safety, read the entire package insert of all medicines prescribed to you and be aware of all of the risks of treatment even beyon Symptoms of restless leg syndrome (RLS) can be treated. Together, you and your healthcare provider can work on your treatment plan. If needed, medicines may be prescribed. Also learn what you can do to ease your discomfort.  Good sleep habits and a healthy Date Last Reviewed: 9/1/2017  © 1117-6741 [employer]. 1407 [facility], [address] [place]. All rights reserved. This information is not intended as a substitute for professional medical care.  Always follow your healthcare professional' · Lifestyle changes, such as regular exercise, controlling caffeine intake, alcohol, and smoking  Date Last Reviewed: 9/1/2017  © 4047-7309 [employer]. 1407 [facility], [address] [place] [address]. All rights reserved.  This information is not i · shortness of breath, troubled breathing, tightness in chest, or wheezing  · signs and symptoms of low blood pressure like dizziness; feeling faint or lightheaded, falls; unusually weak or tired  · swelling of the ankles, feet, hands  · uncontrollable hea You may get drowsy or dizzy. Do not drive, use machinery, or do anything that needs mental alertness until you know how this drug affects you. Do not stand or sit up quickly, especially if you are an older patient.  This reduces the risk of dizzy or faintin

## 2019-08-12 NOTE — PROGRESS NOTES
HPI:    Patient ID: Matthew Plummer is a 81year old female. HPI  Ms. Ligia Patel is a pleasant 81-year-old female with history of GERD, osteoarthritis, lumbar facet arthropathy here today for restless legs.   Apparently this has been ongoing for several Rfl: 2   Ipratropium Bromide 0.03 % Nasal Solution 2 sprays by Nasal route every 12 (twelve) hours. Disp: 1 Bottle Rfl: 0   Cholecalciferol (VITAMIN D) 2000 UNITS Oral Cap Take 1 capsule by mouth daily.  Disp:  Rfl:    Multiple Vitamin (MULTI-VITAMIN DAILY Disp Refills   • rOPINIRole HCl 0.25 MG Oral Tab 90 tablet 1     Sig: Take 1 tablet (0.25 mg total) by mouth nightly.        Imaging & Referrals:  None       RO#[id number]

## 2019-08-16 ENCOUNTER — TELEPHONE (OUTPATIENT)
Dept: FAMILY MEDICINE CLINIC | Facility: CLINIC | Age: 69
End: 2019-08-16

## 2019-08-16 NOTE — TELEPHONE ENCOUNTER
rOPINIRole HCl 0.25 MG Oral Tab    [facility] #[id number] - [facility]. [address] AT [place] [address] [address]  & , [phone], [phone]    Pt states it is not getting better.  Per MD, if not better he told her to call and he can increase th

## 2019-08-16 NOTE — TELEPHONE ENCOUNTER
See attached phone message:  Pt states it is not getting better. --Pt has been on medication x4 nights, increase dose?     Yara Bowden 8/12/19 partial notes:  ASSESSMENT/PLAN:   Restless leg syndrome  (primary encounter diagnosis)  -Discussed wit

## 2019-08-16 NOTE — TELEPHONE ENCOUNTER
Jesse Gamble MD  You 8 minutes ago (1:38 PM)      I will go to 1 mg at this juncture      Informed pt of this recommendation and she expressed understanding and agreement. Task completed.

## 2019-08-19 ENCOUNTER — LAB ENCOUNTER (OUTPATIENT)
Dept: LAB | Age: 69
End: 2019-08-19
Attending: FAMILY MEDICINE
Payer: MEDICARE

## 2019-08-19 ENCOUNTER — OFFICE VISIT (OUTPATIENT)
Dept: FAMILY MEDICINE CLINIC | Facility: CLINIC | Age: 69
End: 2019-08-19
Payer: MEDICARE

## 2019-08-19 VITALS
DIASTOLIC BLOOD PRESSURE: 70 MMHG | HEIGHT: 63.5 IN | WEIGHT: 159 LBS | RESPIRATION RATE: 16 BRPM | SYSTOLIC BLOOD PRESSURE: 120 MMHG | BODY MASS INDEX: 27.82 KG/M2 | TEMPERATURE: 98 F | HEART RATE: 70 BPM

## 2019-08-19 DIAGNOSIS — Z00.00 ENCOUNTER FOR MEDICARE ANNUAL WELLNESS EXAM: ICD-10-CM

## 2019-08-19 DIAGNOSIS — Z00.00 ENCOUNTER FOR MEDICARE ANNUAL WELLNESS EXAM: Primary | ICD-10-CM

## 2019-08-19 DIAGNOSIS — Z00.00 ENCOUNTER FOR ANNUAL HEALTH EXAMINATION: ICD-10-CM

## 2019-08-19 DIAGNOSIS — M81.0 SENILE OSTEOPOROSIS: ICD-10-CM

## 2019-08-19 DIAGNOSIS — Z12.31 VISIT FOR SCREENING MAMMOGRAM: ICD-10-CM

## 2019-08-19 LAB
ALBUMIN SERPL-MCNC: 3.7 G/DL (ref 3.4–5)
ALBUMIN/GLOB SERPL: 1.1 {RATIO} (ref 1–2)
ALP LIVER SERPL-CCNC: 90 U/L (ref 55–142)
ALT SERPL-CCNC: 25 U/L (ref 13–56)
ANION GAP SERPL CALC-SCNC: 8 MMOL/L (ref 0–18)
AST SERPL-CCNC: 20 U/L (ref 15–37)
BASOPHILS # BLD AUTO: 0.05 X10(3) UL (ref 0–0.2)
BASOPHILS NFR BLD AUTO: 0.8 %
BILIRUB SERPL-MCNC: 0.4 MG/DL (ref 0.1–2)
BUN BLD-MCNC: 17 MG/DL (ref 7–18)
BUN/CREAT SERPL: 15.5 (ref 10–20)
CALCIUM BLD-MCNC: 10 MG/DL (ref 8.5–10.1)
CHLORIDE SERPL-SCNC: 107 MMOL/L (ref 98–112)
CHOLEST SMN-MCNC: 250 MG/DL (ref ?–200)
CO2 SERPL-SCNC: 27 MMOL/L (ref 21–32)
CREAT BLD-MCNC: 1.1 MG/DL (ref 0.55–1.02)
DEPRECATED RDW RBC AUTO: 41.3 FL (ref 35.1–46.3)
EOSINOPHIL # BLD AUTO: 0.32 X10(3) UL (ref 0–0.7)
EOSINOPHIL NFR BLD AUTO: 5.1 %
ERYTHROCYTE [DISTWIDTH] IN BLOOD BY AUTOMATED COUNT: 12.5 % (ref 11–15)
GLOBULIN PLAS-MCNC: 3.5 G/DL (ref 2.8–4.4)
GLUCOSE BLD-MCNC: 65 MG/DL (ref 70–99)
HCT VFR BLD AUTO: 41.8 % (ref 35–48)
HDLC SERPL-MCNC: 70 MG/DL (ref 40–59)
HGB BLD-MCNC: 13.2 G/DL (ref 12–16)
IMM GRANULOCYTES # BLD AUTO: 0.01 X10(3) UL (ref 0–1)
IMM GRANULOCYTES NFR BLD: 0.2 %
LDLC SERPL CALC-MCNC: 160 MG/DL (ref ?–100)
LYMPHOCYTES # BLD AUTO: 1.45 X10(3) UL (ref 1–4)
LYMPHOCYTES NFR BLD AUTO: 23.3 %
M PROTEIN MFR SERPL ELPH: 7.2 G/DL (ref 6.4–8.2)
MCH RBC QN AUTO: 28.8 PG (ref 26–34)
MCHC RBC AUTO-ENTMCNC: 31.6 G/DL (ref 31–37)
MCV RBC AUTO: 91.3 FL (ref 80–100)
MONOCYTES # BLD AUTO: 0.4 X10(3) UL (ref 0.1–1)
MONOCYTES NFR BLD AUTO: 6.4 %
NEUTROPHILS # BLD AUTO: 3.99 X10 (3) UL (ref 1.5–7.7)
NEUTROPHILS # BLD AUTO: 3.99 X10(3) UL (ref 1.5–7.7)
NEUTROPHILS NFR BLD AUTO: 64.2 %
NONHDLC SERPL-MCNC: 180 MG/DL (ref ?–130)
OSMOLALITY SERPL CALC.SUM OF ELEC: 294 MOSM/KG (ref 275–295)
PLATELET # BLD AUTO: 270 10(3)UL (ref 150–450)
POTASSIUM SERPL-SCNC: 4 MMOL/L (ref 3.5–5.1)
RBC # BLD AUTO: 4.58 X10(6)UL (ref 3.8–5.3)
SODIUM SERPL-SCNC: 142 MMOL/L (ref 136–145)
TRIGL SERPL-MCNC: 102 MG/DL (ref 30–149)
TSI SER-ACNC: 1.49 MIU/ML (ref 0.36–3.74)
VIT D+METAB SERPL-MCNC: 61.1 NG/ML (ref 30–100)
VLDLC SERPL CALC-MCNC: 20 MG/DL (ref 0–30)
WBC # BLD AUTO: 6.2 X10(3) UL (ref 4–11)

## 2019-08-19 PROCEDURE — 80061 LIPID PANEL: CPT

## 2019-08-19 PROCEDURE — G0439 PPPS, SUBSEQ VISIT: HCPCS | Performed by: FAMILY MEDICINE

## 2019-08-19 PROCEDURE — 85025 COMPLETE CBC W/AUTO DIFF WBC: CPT

## 2019-08-19 PROCEDURE — 84443 ASSAY THYROID STIM HORMONE: CPT

## 2019-08-19 PROCEDURE — 36415 COLL VENOUS BLD VENIPUNCTURE: CPT

## 2019-08-19 PROCEDURE — 82306 VITAMIN D 25 HYDROXY: CPT

## 2019-08-19 PROCEDURE — 80053 COMPREHEN METABOLIC PANEL: CPT

## 2019-08-19 NOTE — PROGRESS NOTES
HPI:   Bibi Diallo is a 81year old female who presents for a Medicare Subsequent Annual Wellness visit (Pt already had Initial Annual Wellness). Mrs. Mallorie Guerra is a pleasant 61-year-old female with history of osteoporosis, GERD, restless leg syn quittin.2      Smokeless tobacco: Never Used      Tobacco comment: quit 30 years ago         CAGE Alcohol screening   Eneida Martínez was screened for Alcohol abuse and had a score of 0 so is at low risk.     Patient Care Team: Patient Care Team: taking differently: Take 0.5 mg by mouth nightly.  )   Meloxicam (MOBIC) 15 MG Oral Tab Take 1 tablet (15 mg total) by mouth daily. Denosumab 60 MG/ML Subcutaneous Solution Prefilled Syringe Inject 1 mL (60 mg total) into the skin once.  Historical docume never used smokeless tobacco. She reports that she drinks alcohol. She reports that she does not use drugs.      REVIEW OF SYSTEMS:   Review of Systems   GENERAL: feels well otherwise  SKIN: denies any unusual skin lesions  EYES: denies blurred vision or do There is no tenderness. Musculoskeletal: She exhibits no tenderness. Lymphadenopathy:     She has no cervical adenopathy. Neurological: She is alert and oriented to person, place, and time. No cranial nerve deficit or motor deficit.    Skin: Skin is w pounds without trying?: 2 - No  Has your appetite been poor?: No  How does the patient maintain a good energy level?: Daily Walks  How would you describe your daily physical activity?: Moderate  How would you describe your current health state?: Good  How CHLAMYDIA No flowsheet data found.     Screening Mammogram      Mammogram  Annually to 78, then as discussed Mammogram due on 09/30/2016 Update Health Maintenance if applicable     Immunizations (Update Immunization Activity if applicable)     Influenza  Co

## 2019-08-28 ENCOUNTER — HOSPITAL ENCOUNTER (OUTPATIENT)
Dept: MAMMOGRAPHY | Age: 69
Discharge: HOME | End: 2019-08-28
Attending: FAMILY MEDICINE
Payer: MEDICARE

## 2019-08-28 ENCOUNTER — HOSPITAL ENCOUNTER (OUTPATIENT)
Dept: BONE DENSITY | Age: 69
Discharge: HOME | End: 2019-08-28
Attending: INTERNAL MEDICINE
Payer: MEDICARE

## 2019-08-28 DIAGNOSIS — M81.0 SENILE OSTEOPOROSIS: ICD-10-CM

## 2019-08-28 DIAGNOSIS — Z12.31 VISIT FOR SCREENING MAMMOGRAM: ICD-10-CM

## 2019-08-28 PROCEDURE — 77063 BREAST TOMOSYNTHESIS BI: CPT | Performed by: FAMILY MEDICINE

## 2019-08-28 PROCEDURE — 77067 SCR MAMMO BI INCL CAD: CPT | Performed by: FAMILY MEDICINE

## 2019-08-28 PROCEDURE — 77080 DXA BONE DENSITY AXIAL: CPT | Performed by: INTERNAL MEDICINE

## 2019-09-12 ENCOUNTER — HOSPITAL ENCOUNTER (OUTPATIENT)
Dept: ULTRASOUND IMAGING | Age: 69
Discharge: HOME | End: 2019-09-12
Attending: FAMILY MEDICINE
Payer: MEDICARE

## 2019-09-12 ENCOUNTER — HOSPITAL ENCOUNTER (OUTPATIENT)
Dept: MAMMOGRAPHY | Age: 69
Discharge: HOME | End: 2019-09-12
Attending: FAMILY MEDICINE
Payer: MEDICARE

## 2019-09-12 DIAGNOSIS — R92.2 INCONCLUSIVE MAMMOGRAM: ICD-10-CM

## 2019-09-12 PROCEDURE — 76642 ULTRASOUND BREAST LIMITED: CPT | Performed by: FAMILY MEDICINE

## 2019-09-12 PROCEDURE — 77066 DX MAMMO INCL CAD BI: CPT | Performed by: FAMILY MEDICINE

## 2019-09-12 PROCEDURE — 77062 BREAST TOMOSYNTHESIS BI: CPT | Performed by: FAMILY MEDICINE

## 2019-09-17 RX ORDER — ROPINIROLE 0.25 MG/1
0.25 TABLET, FILM COATED ORAL NIGHTLY
Qty: 90 TABLET | Refills: 1 | Status: SHIPPED | OUTPATIENT
Start: 2019-09-17 | End: 2019-09-18

## 2019-09-17 NOTE — TELEPHONE ENCOUNTER
Patient will need a refill on the Ropinirole . 25 mg. Patient states that she takes two a day and sometimes even takes three. Please send to local [address] [place] [address]

## 2019-09-17 NOTE — TELEPHONE ENCOUNTER
Requested Prescriptions     Pending Prescriptions Disp Refills   • rOPINIRole HCl 0.25 MG Oral Tab 90 tablet 1     Sig: Take 1 tablet (0.25 mg total) by mouth nightly.      Non protocol medication    LOV: 8/19/2019  RTC: 1 YEAR  Last Relevant Labs: N/A  Devyn

## 2019-09-18 ENCOUNTER — TELEPHONE (OUTPATIENT)
Dept: FAMILY MEDICINE CLINIC | Facility: CLINIC | Age: 69
End: 2019-09-18

## 2019-09-18 RX ORDER — ROPINIROLE 0.25 MG/1
1 TABLET, FILM COATED ORAL 4 TIMES DAILY
Qty: 120 TABLET | Refills: 5 | Status: SHIPPED | OUTPATIENT
Start: 2019-09-18 | End: 2019-12-18

## 2019-09-18 NOTE — TELEPHONE ENCOUNTER
rOPINIRole HCl 0.25 MG Oral Tab   Please advise on Sig? Patient states she needs a new script updated with the correct instructions and would like a call back.

## 2019-09-18 NOTE — TELEPHONE ENCOUNTER
Patient prefers to have 0.25mg as she can take 2-4 pills depending on the pain.  Patient informed of RX being sent    Per Dr Karon Rivera, check with patient to see if she would like to switch to 1mg-1 tab instead  RX sent for 0.25mg

## 2019-09-18 NOTE — TELEPHONE ENCOUNTER
Pt states the direction on script is incorrect. It is telling her to take 1 tablet a day when she said Dr. Wilder Vanessa told her to take 4 tablets a day.      Pt is leaving for out of town and needs this issue to be resolved so she can ensure she will have enou

## 2019-12-18 RX ORDER — ROPINIROLE 0.25 MG/1
TABLET, FILM COATED ORAL
Qty: 360 TABLET | Refills: 1 | Status: SHIPPED | OUTPATIENT
Start: 2019-12-18 | End: 2020-12-03

## 2019-12-18 NOTE — TELEPHONE ENCOUNTER
Requesting ROPINIROLE HCL 0.25 MG   LOV: 8/19/19  RTC: 1 year  Last Relevant Labs:8/19/19  Filled: 9/18/19 # 120 with 5 refills    Future Appointments   Date Time Provider Yefri Reagan   1/9/2020  9:00 AM Sweta Harrison, APRN [facility] SUB GI   2/24/

## 2019-12-19 RX ORDER — ROPINIROLE 0.25 MG/1
TABLET, FILM COATED ORAL
Qty: 1440 TABLET | Refills: 0 | OUTPATIENT
Start: 2019-12-19

## 2020-03-02 ENCOUNTER — OFFICE VISIT (OUTPATIENT)
Dept: FAMILY MEDICINE CLINIC | Facility: CLINIC | Age: 70
End: 2020-03-02
Payer: MEDICARE

## 2020-03-02 VITALS
HEIGHT: 63 IN | HEART RATE: 78 BPM | TEMPERATURE: 98 F | BODY MASS INDEX: 27.29 KG/M2 | RESPIRATION RATE: 16 BRPM | WEIGHT: 154 LBS | SYSTOLIC BLOOD PRESSURE: 110 MMHG | DIASTOLIC BLOOD PRESSURE: 80 MMHG

## 2020-03-02 DIAGNOSIS — G89.29 CHRONIC RIGHT SHOULDER PAIN: Primary | ICD-10-CM

## 2020-03-02 DIAGNOSIS — M25.511 CHRONIC RIGHT SHOULDER PAIN: Primary | ICD-10-CM

## 2020-03-02 PROCEDURE — 99213 OFFICE O/P EST LOW 20 MIN: CPT | Performed by: FAMILY MEDICINE

## 2020-03-02 NOTE — PROGRESS NOTES
HPI:    Patient ID: Fermin Jin is a 81year old female. HPI  Ms. Ghazal Staples is a pleasant 80 y/o F with PMHx of GERD, OA, CKD here today for right shoulder pain which has been ongoing for the past 2 months.   Pain is located on the anterior aspect administration details 1 mL 0   • Levocetirizine Dihydrochloride (XYZAL ALLERGY 24HR) 5 MG Oral Tab Take 5 mg by mouth every evening. • Denosumab 60 MG/ML Subcutaneous Solution Prefilled Syringe Inject 1 mL (60 mg total) into the skin once.  Historical (CLQ=[id number])       [id number]

## 2020-03-02 NOTE — PATIENT INSTRUCTIONS
Thank you for choosing Loraien Wall MD at [place] [address]  To Do: Gigi Dewitt  1. Please have MRI done as ordered  [facility] is located in [address]. Monday, Tuesday & Friday – 8 a.m. to 4 p.m.   Wednesday, Thursday – 7 a.m. to 3 outweigh those potential risks and we strive to make you healthier and to improve your quality of life.     Referrals, and Radiology Information:    If your insurance requires a referral to a specialist, please allow 5 business days to process your referral impingement syndrome may include:  · Shoulder pain that gets worse when you raise your arm overhead  · Weakness of the shoulder muscles when you use your arm overhead  · Popping and clicking when you move your shoulder  · Shoulder pain that wakes you up at hand  · Loss of shoulder strength  · Fever or chills  Date Last Reviewed: 8/1/2016  © 1330-8607 [employer] [address]. 1407 [facility], [address] [place] [address]. All rights reserved.  This information is not intended as a substitute for professional med

## 2020-03-03 ENCOUNTER — HOSPITAL ENCOUNTER (OUTPATIENT)
Dept: MRI IMAGING | Age: 70
Discharge: HOME | End: 2020-03-03
Attending: FAMILY MEDICINE
Payer: MEDICARE

## 2020-03-03 DIAGNOSIS — G89.29 CHRONIC RIGHT SHOULDER PAIN: ICD-10-CM

## 2020-03-03 DIAGNOSIS — M25.511 CHRONIC RIGHT SHOULDER PAIN: ICD-10-CM

## 2020-03-03 PROCEDURE — 73221 MRI JOINT UPR EXTREM W/O DYE: CPT | Performed by: FAMILY MEDICINE

## 2020-07-20 ENCOUNTER — OFFICE VISIT (OUTPATIENT)
Dept: FAMILY MEDICINE CLINIC | Facility: CLINIC | Age: 70
End: 2020-07-20
Payer: MEDICARE

## 2020-07-20 VITALS
WEIGHT: 141 LBS | SYSTOLIC BLOOD PRESSURE: 100 MMHG | HEIGHT: 63 IN | RESPIRATION RATE: 16 BRPM | DIASTOLIC BLOOD PRESSURE: 72 MMHG | TEMPERATURE: 98 F | HEART RATE: 74 BPM | OXYGEN SATURATION: 98 % | BODY MASS INDEX: 24.98 KG/M2

## 2020-07-20 DIAGNOSIS — H65.92 MIDDLE EAR EFFUSION, LEFT: ICD-10-CM

## 2020-07-20 DIAGNOSIS — H61.21 RIGHT EAR IMPACTED CERUMEN: Primary | ICD-10-CM

## 2020-07-20 PROCEDURE — 99213 OFFICE O/P EST LOW 20 MIN: CPT | Performed by: FAMILY MEDICINE

## 2020-07-20 RX ORDER — FEXOFENADINE HCL AND PSEUDOEPHEDRINE HCI 180; 240 MG/1; MG/1
1 TABLET, EXTENDED RELEASE ORAL DAILY
Qty: 7 TABLET | Refills: 0 | Status: SHIPPED | OUTPATIENT
Start: 2020-07-20 | End: 2020-07-27

## 2020-07-20 NOTE — PROGRESS NOTES
HPI:   Patient presents with:  Ear Problem: She states everytime she takes a shower or in water, her ears \"clog\" up and takes a couple days to hear. She believes she has swimmers ear. - she is due for complete physical with PCP.        Ethan Fox enlarged, symmetric, no tenderness/mass/nodules       ASSESSMENT AND PLAN:     1. Right ear impacted cerumen  Advised to use debrox for 1 week and f/u for cerumen removal.     2. Middle ear effusion, left  Advised to start decongestant, and use flonase.

## 2020-07-20 NOTE — PATIENT INSTRUCTIONS
Common Middle Ear Problems  Middle ear problems may be caused by something that occurs at birth or right after birth (congenital). This may be an inherited condition.  Or it may be due to medicines or to a viral infection such as hepatitis, HIV, syphilis, This type of hearing loss can often be treated with medicine or surgery. Sensorineural hearing loss  This is the most common type of lifelong hearing loss. It occurs after damage to the inner ear.  It can also occur when there are problems with the nerves Impacted earwax is a buildup of the natural wax in the ear. Impacted earwax is very common. It can cause symptoms such as hearing loss. It can also make it hard for a healthcare provider to check your ear.    Understanding earwax  Tiny glands in your ear ma · Ear drainage  · Dizziness  · Ringing in the ears  · Cough  Treatment for impacted earwax  If you don’t have symptoms, you may not need treatment. Often the earwax goes away on its own with time.  If you have symptoms, you may have 1 or more treatments suc

## 2020-07-25 ENCOUNTER — LAB ENCOUNTER (OUTPATIENT)
Dept: LAB | Facility: HOSPITAL | Age: 70
End: 2020-07-25
Attending: INTERNAL MEDICINE
Payer: MEDICARE

## 2020-07-25 DIAGNOSIS — Z01.818 PRE-OP TESTING: ICD-10-CM

## 2020-07-26 LAB — SARS-COV-2 RNA RESP QL NAA+PROBE: NOT DETECTED

## 2020-07-27 ENCOUNTER — OFFICE VISIT (OUTPATIENT)
Dept: FAMILY MEDICINE CLINIC | Facility: CLINIC | Age: 70
End: 2020-07-27
Payer: MEDICARE

## 2020-07-27 VITALS
OXYGEN SATURATION: 98 % | HEART RATE: 68 BPM | BODY MASS INDEX: 24.98 KG/M2 | WEIGHT: 141 LBS | HEIGHT: 63 IN | TEMPERATURE: 97 F | RESPIRATION RATE: 16 BRPM | DIASTOLIC BLOOD PRESSURE: 70 MMHG | SYSTOLIC BLOOD PRESSURE: 110 MMHG

## 2020-07-27 DIAGNOSIS — H61.21 RIGHT EAR IMPACTED CERUMEN: Primary | ICD-10-CM

## 2020-07-27 PROCEDURE — 69210 REMOVE IMPACTED EAR WAX UNI: CPT | Performed by: FAMILY MEDICINE

## 2020-07-27 NOTE — PROGRESS NOTES
Cerumen Removal Procedure Note (24756)    The risks/benefits of the following procedure were discussed with the patient/family and they have given verbal consent to proceed. Pre-operative Diagnosis: No diagnosis found.     Post-operative Diagnosis: Jamshid

## 2020-07-28 PROBLEM — Z86.010 PERSONAL HISTORY OF COLONIC POLYPS: Status: ACTIVE | Noted: 2020-07-28

## 2020-07-28 PROBLEM — Z86.0100 PERSONAL HISTORY OF COLONIC POLYPS: Status: ACTIVE | Noted: 2020-07-28

## 2020-08-04 NOTE — TELEPHONE ENCOUNTER
Requesting Gabapentin   LOV: 9/19/18  RTC: 2 mo   Last Relevant Labs: n/a   Filled: 9/17/18 #30 with 0 refills    No future appointments. Pended if okay. Non protocol med can not be approved by RN per [employer]. ,   Health Maintenance   Topic Date Due    Cervical cancer screen  02/16/2020    Flu vaccine (1) 09/01/2020    Potassium monitoring  06/12/2021    Creatinine monitoring  06/12/2021    DTaP/Tdap/Td vaccine (2 - Td) 09/23/2026    HIV screen  Completed    Hepatitis A vaccine  Aged Out    Hepatitis B vaccine  Aged Out    Hib vaccine  Aged Out    Meningococcal (ACWY) vaccine  Aged Out    Pneumococcal 0-64 years Vaccine  Aged Out    Varicella vaccine  Discontinued       PHYSICAL EXAMINATION:  [ INSTRUCTIONS:  \"[x]\" Indicates a positive item  \"[]\" Indicates a negative item  -- DELETE ALL ITEMS NOT EXAMINED]  Vital Signs: (As obtained by patient/caregiver or practitioner observation)    Blood pressure-  Heart rate-    Respiratory rate-    Temperature-  Pulse oximetry-     Constitutional: [x] Appears well-developed and well-nourished [x] No apparent distress      [] Abnormal-   Mental status  [x] Alert and awake  [x] Oriented to person/place/time [x]Able to follow commands      Eyes:  EOM    [x]  Normal  [] Abnormal-  Sclera  [x]  Normal  [] Abnormal -         Discharge [x]  None visible  [] Abnormal -    HENT:   [x] Normocephalic, atraumatic. [] Abnormal   [x] Mouth/Throat: Mucous membranes are moist.     External Ears [] Normal  [] Abnormal-     Neck: [x] No visualized mass     Pulmonary/Chest: [x] Respiratory effort normal.  [x] No visualized signs of difficulty breathing or respiratory distress        [] Abnormal-      Musculoskeletal:   [] Normal gait with no signs of ataxia         [x] Normal range of motion of neck        [] Abnormal-       Neurological:        [x] No Facial Asymmetry (Cranial nerve 7 motor function) (limited exam to video visit)          [x] No gaze palsy        [] Abnormal-         Skin:        [] No significant exanthematous lesions or discoloration noted on facial skin         [x] Abnormal-  Upper and lower lip swelling with fluid filled blisters. No s/s of infection.   Denies difficulty swallowing or breathing. Psychiatric:       [x] Normal Affect [x] No Hallucinations        [] Abnormal-     Other pertinent observable physical exam findings-     ASSESSMENT/PLAN:  1. Allergic reaction, initial encounter  Pt c/o upper and lower lip swelling with burning, itching and blisters. She states that the symptoms are worse at the end of the work week after wearing a mask for a long period of time. She has been treated with oral and IM steroids at a local urgent care 4 weeks ago, which did help with her symptoms. She states that she has tried several different types of masks/materials, and she continues to have the reaction. I recommended that the pt wear a facial shield, she agrees with plan of care. I will order a high dose prednisone taper. She also requests a work note stating that she can not wear a mask d/t allergic reactions. - predniSONE (DELTASONE) 20 MG tablet; Take 3 tabs by mouth daily for 3 days, then 2 tabs by mouth daily for 3 days, then 1 tab by mouth daily for 3 days. Dispense: 18 tablet; Refill: 0      Return if symptoms worsen or fail to improve. Crispin Cramer is a 31 y.o. female being evaluated by a Virtual Visit (video visit) encounter to address concerns as mentioned above. A caregiver was present when appropriate. Due to this being a TeleHealth encounter (During [facility]-69 public health emergency), evaluation of the following organ systems was limited: Vitals/Constitutional/EENT/Resp/CV/GI//MS/Neuro/Skin/Heme-Lymph-Imm. Pursuant to the emergency declaration under the [facility], [address] [place] [address] authority and the [employer] Act, this Virtual Visit was conducted with patient's (and/or legal guardian's) consent, to reduce the patient's risk of exposure to COVID-19 and provide necessary medical care.   The patient (and/or legal guardian) has also been advised to contact this office for worsening conditions or problems, and seek emergency medical treatment and/or call 911 if deemed necessary. Patient identification was verified at the start of the visit: Yes    Total time spent on this encounter: 20 minutes    Services were provided through a video synchronous discussion virtually to substitute for in-person clinic visit. Patient and provider were located at their individual homes. --HANK Vegas - CNP on 8/4/2020 at 10:24 AM    An electronic signature was used to authenticate this note.

## 2020-08-26 ENCOUNTER — VIRTUAL PHONE E/M (OUTPATIENT)
Dept: FAMILY MEDICINE CLINIC | Facility: CLINIC | Age: 70
End: 2020-08-26
Payer: MEDICARE

## 2020-08-26 ENCOUNTER — TELEPHONE (OUTPATIENT)
Dept: FAMILY MEDICINE CLINIC | Facility: CLINIC | Age: 70
End: 2020-08-26

## 2020-08-26 DIAGNOSIS — N30.00 ACUTE CYSTITIS WITHOUT HEMATURIA: Primary | ICD-10-CM

## 2020-08-26 PROCEDURE — 99441 PHONE E/M BY PHYS 5-10 MIN: CPT | Performed by: FAMILY MEDICINE

## 2020-08-26 RX ORDER — SULFAMETHOXAZOLE AND TRIMETHOPRIM 800; 160 MG/1; MG/1
1 TABLET ORAL 2 TIMES DAILY
Qty: 20 TABLET | Refills: 0 | Status: SHIPPED | OUTPATIENT
Start: 2020-08-26 | End: 2020-09-05

## 2020-08-26 NOTE — PROGRESS NOTES
Virtual Telephone Check-In    Matthew Plummer verbally consents to a Virtual/Telephone Check-In visit on 08/26/20. Patient has been referred to the [facility] website at [email or web address] to review the yearly Consent to Treat document.     Patient und

## 2020-08-26 NOTE — PROGRESS NOTES
HPI:    Patient ID: Vilma Cesar is a 78year old female. HPI  Ms. Ivett Grant is a pleasant 63-year-old female with history of GERD, restless leg syndrome, osteoarthritis presenting for phone visit for urinary frequency for the past few days associa Tab Take 5 mg by mouth every evening. • Denosumab 60 MG/ML Subcutaneous Solution Prefilled Syringe Inject 1 mL (60 mg total) into the skin once.  Historical documentation - see Epic Immunization Activity for administration details 1 mL 0   • Ipratropium

## 2020-08-26 NOTE — TELEPHONE ENCOUNTER
Scheduled pt for TV for today. Therapy Activity Session  Performed by [address] staff     ICU: Acute ICU Activity Program activity plan was established by a licensed therapist and performed under the guidance of a licensed therapist.      Pt seen on [address] [place] unit                                                                                         PT Frequency Frequency Comments: M-F MRICU                                                                                  OT Frequency Frequency Comments: [facility]) ICU    SLP Frequency Frequency: VIDEO 9:15 0/1 on 2/20, NPO with NG, ensure PMSV comes to video, see plan (2/19)    Availability:  Attempted to work with patient this date, unable to due to  with another medical provider. Tolerance/Participation  Attempted, Not seen. SESSION    Activities/Exercises/Mobility completed this session:  Physical Therapy Exercises    TEP Follow Up Needed: Yes  Therapy Extender Program Discipline: OT                                                                                 Occupational Therapy Exercises    TEP Follow Up Needed: Yes  Therapy Extender Program Discipline: OT        OT Frequency: M-F, ICU     OT Task 1: AAROM bilateral shoulder flexion   OT Reps for Task 1: 10  OT Sets for Task 1: 1  OT Resistance for Task 1: 0       OT Task 2: AAROM bilateral elbow flex/ext supination/pronation  OT Reps for Task 2: 10  OT Sets for Task 2: 1  OT Resistance for Task 2: 0       OT Task 3: B shoulder internal/external rotation  OT Reps for Task 3: 10  OT Sets for Task 3: 1  OT Resistance for Task 3: 0  OT Task 3 Completion: Completed       Speech Therapy Exercises    TEP Follow Up Needed:  Yes

## 2020-08-26 NOTE — TELEPHONE ENCOUNTER
Spoke to pt, states she started with \"burning when urinating\" a few days ago  +frequency  +urgency  No fever  No abdominal pain  No lower back pain  No hematuria    Pt states she gets UTIs \"often\" and it feels like she has another one    Pt requesting

## 2020-08-26 NOTE — PATIENT INSTRUCTIONS
Thank you for choosing Will De La Rosa MD at [place] [address]  To Do: Gigi Dewitt  1. Please take meds as directed. [facility] is located in [address]. Monday, Tuesday & Friday – 8 a.m. to 4 p.m.   Wednesday, Thursday – 7 a.m. to 3 outweigh those potential risks and we strive to make you healthier and to improve your quality of life.     Referrals, and Radiology Information:    If your insurance requires a referral to a specialist, please allow 5 business days to process your referral

## 2020-10-28 ENCOUNTER — TELEPHONE (OUTPATIENT)
Dept: FAMILY MEDICINE CLINIC | Facility: CLINIC | Age: 70
End: 2020-10-28

## 2020-10-29 RX ORDER — SULFAMETHOXAZOLE AND TRIMETHOPRIM 800; 160 MG/1; MG/1
1 TABLET ORAL 2 TIMES DAILY
Qty: 20 TABLET | Refills: 0 | Status: SHIPPED | OUTPATIENT
Start: 2020-10-29 | End: 2020-11-08

## 2020-10-29 NOTE — TELEPHONE ENCOUNTER
Spoke to pt, started with symptoms 3 days ago  +frequency  +dysuria  +lower abdominal pain  No fever  No hematuria  Pt declines appt, states she was just given an antibiotic last time and would like the same.     Bactrim DS given on 8/26/2020    Do you want

## 2020-10-29 NOTE — TELEPHONE ENCOUNTER
Spoke to pt, informed Rx for Bactrim sent to pharmacy. Advised pt to contact office if symptoms not improving. Pt verbalized understanding and agreement. All questions answered.

## 2020-12-03 RX ORDER — ROPINIROLE 0.25 MG/1
1 TABLET, FILM COATED ORAL 4 TIMES DAILY
Qty: 360 TABLET | Refills: 1 | Status: SHIPPED | OUTPATIENT
Start: 2020-12-03 | End: 2021-01-28

## 2021-01-28 RX ORDER — ROPINIROLE 0.25 MG/1
1 TABLET, FILM COATED ORAL 4 TIMES DAILY
Qty: 360 TABLET | Refills: 1 | Status: SHIPPED | OUTPATIENT
Start: 2021-01-28 | End: 2021-08-27

## 2021-03-18 ENCOUNTER — IMMUNIZATION (OUTPATIENT)
Dept: LAB | Age: 71
End: 2021-03-18
Attending: HOSPITALIST
Payer: MEDICARE

## 2021-03-18 DIAGNOSIS — Z23 NEED FOR VACCINATION: Primary | ICD-10-CM

## 2021-03-18 PROCEDURE — 0001A SARSCOV2 VAC 30MCG/0.3ML IM: CPT

## 2021-04-08 ENCOUNTER — IMMUNIZATION (OUTPATIENT)
Dept: LAB | Age: 71
End: 2021-04-08
Attending: HOSPITALIST
Payer: MEDICARE

## 2021-04-08 DIAGNOSIS — Z23 NEED FOR VACCINATION: Primary | ICD-10-CM

## 2021-04-08 PROCEDURE — 0002A SARSCOV2 VAC 30MCG/0.3ML IM: CPT

## 2021-06-15 ENCOUNTER — OFFICE VISIT (OUTPATIENT)
Dept: FAMILY MEDICINE CLINIC | Facility: CLINIC | Age: 71
End: 2021-06-15
Payer: MEDICARE

## 2021-06-15 ENCOUNTER — LAB ENCOUNTER (OUTPATIENT)
Dept: LAB | Age: 71
End: 2021-06-15
Attending: FAMILY MEDICINE
Payer: MEDICARE

## 2021-06-15 ENCOUNTER — HOSPITAL ENCOUNTER (OUTPATIENT)
Dept: GENERAL RADIOLOGY | Age: 71
Discharge: HOME | End: 2021-06-15
Attending: FAMILY MEDICINE
Payer: MEDICARE

## 2021-06-15 ENCOUNTER — TELEPHONE (OUTPATIENT)
Dept: FAMILY MEDICINE CLINIC | Facility: CLINIC | Age: 71
End: 2021-06-15

## 2021-06-15 VITALS
RESPIRATION RATE: 14 BRPM | TEMPERATURE: 98 F | DIASTOLIC BLOOD PRESSURE: 70 MMHG | HEART RATE: 72 BPM | BODY MASS INDEX: 28.17 KG/M2 | HEIGHT: 63 IN | WEIGHT: 159 LBS | SYSTOLIC BLOOD PRESSURE: 120 MMHG | OXYGEN SATURATION: 98 %

## 2021-06-15 DIAGNOSIS — M79.89 LEG SWELLING: ICD-10-CM

## 2021-06-15 DIAGNOSIS — R14.0 ABDOMINAL BLOATING: Primary | ICD-10-CM

## 2021-06-15 DIAGNOSIS — R14.0 ABDOMINAL BLOATING: ICD-10-CM

## 2021-06-15 PROCEDURE — 74018 RADEX ABDOMEN 1 VIEW: CPT | Performed by: FAMILY MEDICINE

## 2021-06-15 PROCEDURE — 36415 COLL VENOUS BLD VENIPUNCTURE: CPT

## 2021-06-15 PROCEDURE — 99214 OFFICE O/P EST MOD 30 MIN: CPT | Performed by: FAMILY MEDICINE

## 2021-06-15 PROCEDURE — 80048 BASIC METABOLIC PNL TOTAL CA: CPT

## 2021-06-15 RX ORDER — FUROSEMIDE 20 MG/1
20 TABLET ORAL DAILY
Qty: 10 TABLET | Refills: 0 | Status: SHIPPED | OUTPATIENT
Start: 2021-06-15

## 2021-06-15 NOTE — PATIENT INSTRUCTIONS
Thank you for choosing Kavitha Solomon MD at [place] [address]  To Do: Gigi Dewitt  1. Please take meds as directed. 2. May take Miralax daily and Colace OTC twice a day as needed  [facility] is located in [address].   Monday, Tuesday problems arise, but know that our intention is that the benefits outweigh those potential risks and we strive to make you healthier and to improve your quality of life.     Referrals, and Radiology Information:    If your insurance requires a referral to a blood flow. · Some medicines. These include some hormones such as birth control pills, some blood pressure medicines such as calcium channel blockers, steroids, and some antidepressants such as MAO inhibitors and tricyclics.   · Menstrual periods that caus healthcare provider  · Yellow color to your skin or eyes  · Rapid, unexplained weight gain  · Having to sleep upright or use an increased number of pillow     Call 911  This is the fastest and safest way to get to the emergency department.  The paramedics c

## 2021-06-15 NOTE — TELEPHONE ENCOUNTER
Future Appointments   Date Time Provider Yefri Merary   6/15/2021 11:00 AM Dennie Pore, MD [facility] [address]   6/18/2021  9:40 AM [facility]   9/3/2021  9:00 AM [place] [facility] RN [address] [place] [address]   3/7/2022  9:15 A

## 2021-06-15 NOTE — TELEPHONE ENCOUNTER
See TE from Dr. Sabrina Lopez yesterday 6/14/21. Patient has gained 5 pounds of water since Thursday. Dr. Sabrina Lopez told patient to call PCP who may order CMP and consider diuretic.      Please advise

## 2021-06-15 NOTE — TELEPHONE ENCOUNTER
Patient states she had rotator cuff surgery, now retaining water, surgeon said to call the PCP, please advise.

## 2021-06-15 NOTE — PROGRESS NOTES
HPI/Subjective:   Patient ID: Carter Samuels is a 73year old female. HPI  Ms. Debbie Cerna is a pleasant 72-year-old female with known history of osteoporosis allergies, restless leg syndrome who recently underwent right shoulder surgery here today as Activity for administration details 1 mL 0   • ROPINIROLE HCL 0.25 MG Oral Tab TAKE 4 TABLETS (1 MG TOTAL) BY MOUTH 4 (FOUR) TIMES DAILY.  360 tablet 1   • PEG 3350-KCl-NaBcb-NaCl-NaSulf (PEG 3350/ELECTROLYTES) 240 g Oral Recon Soln Take as directed by phys or tenderness   Lymphadenopathy:      Cervical: No cervical adenopathy. Skin:     General: Skin is warm. Neurological:      Mental Status: She is alert.          Assessment & Plan:   Abdominal bloating  (primary encounter diagnosis)  -Likely secondary t

## 2021-06-23 RX ORDER — HYDROCODONE BITARTRATE AND ACETAMINOPHEN 10; 325 MG/1; MG/1
1 TABLET ORAL
Qty: 30 TABLET | Refills: 0 | Status: SHIPPED | OUTPATIENT
Start: 2021-06-23 | End: 2022-01-07 | Stop reason: ALTCHOICE

## 2021-08-27 RX ORDER — ROPINIROLE 0.25 MG/1
1 TABLET, FILM COATED ORAL 4 TIMES DAILY
Qty: 360 TABLET | Refills: 1 | Status: SHIPPED | OUTPATIENT
Start: 2021-08-27 | End: 2022-01-17

## 2022-01-01 NOTE — PROGRESS NOTES
Pt here for Prolia, but she only had a basic chemo panel. All labs drawn today, pt aware she needs to schedule her prolia injection in the next two weeks. She could not stay because she had to get back to work. hard copy, drawn during this pregnancy

## 2022-01-17 RX ORDER — ROPINIROLE 0.25 MG/1
1 TABLET, FILM COATED ORAL 4 TIMES DAILY
Qty: 360 TABLET | Refills: 1 | Status: SHIPPED | OUTPATIENT
Start: 2022-01-17

## 2022-02-10 ENCOUNTER — TELEMEDICINE (OUTPATIENT)
Dept: FAMILY MEDICINE CLINIC | Facility: CLINIC | Age: 72
End: 2022-02-10
Payer: MEDICARE

## 2022-02-10 DIAGNOSIS — M25.571 ACUTE RIGHT ANKLE PAIN: Primary | ICD-10-CM

## 2022-02-10 PROCEDURE — 99441 PHONE E/M BY PHYS 5-10 MIN: CPT | Performed by: FAMILY MEDICINE

## 2022-02-10 RX ORDER — METHYLPREDNISOLONE 4 MG/1
TABLET ORAL
Qty: 1 EACH | Refills: 0 | Status: SHIPPED | OUTPATIENT
Start: 2022-02-10 | End: 2022-02-17 | Stop reason: ALTCHOICE

## 2022-02-17 ENCOUNTER — HOSPITAL ENCOUNTER (OUTPATIENT)
Dept: GENERAL RADIOLOGY | Age: 72
Discharge: HOME | End: 2022-02-17
Attending: FAMILY MEDICINE
Payer: MEDICARE

## 2022-02-17 ENCOUNTER — OFFICE VISIT (OUTPATIENT)
Dept: FAMILY MEDICINE CLINIC | Facility: CLINIC | Age: 72
End: 2022-02-17
Payer: MEDICARE

## 2022-02-17 VITALS
HEIGHT: 63 IN | RESPIRATION RATE: 16 BRPM | SYSTOLIC BLOOD PRESSURE: 122 MMHG | OXYGEN SATURATION: 98 % | BODY MASS INDEX: 26.93 KG/M2 | HEART RATE: 76 BPM | TEMPERATURE: 97 F | WEIGHT: 152 LBS | DIASTOLIC BLOOD PRESSURE: 70 MMHG

## 2022-02-17 DIAGNOSIS — M25.571 ACUTE RIGHT ANKLE PAIN: ICD-10-CM

## 2022-02-17 DIAGNOSIS — M25.571 ACUTE RIGHT ANKLE PAIN: Primary | ICD-10-CM

## 2022-02-17 PROCEDURE — 99213 OFFICE O/P EST LOW 20 MIN: CPT | Performed by: FAMILY MEDICINE

## 2022-02-17 PROCEDURE — 73610 X-RAY EXAM OF ANKLE: CPT | Performed by: FAMILY MEDICINE

## 2022-05-02 ENCOUNTER — TELEPHONE (OUTPATIENT)
Dept: FAMILY MEDICINE CLINIC | Facility: CLINIC | Age: 72
End: 2022-05-02

## 2022-07-06 NOTE — PROGRESS NOTES
5637 Patient arrived back to [facility]. Report given to this nurse from [facility]. Patient A&O 0/10 pain. Beverage of choice offered to patient. Call light in reach and bed in lowest position. 1030   IV removed. Discharge instructions given to patient. Patient sitting on side of bed getting dressed. 1042 Patient escorted to car via foot where he drove self home. Carter Samuels is a 74year old female. HPI:   Patient states she has had loose stools 4 weeks and was referred to GI and saw Dr. 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   She had an abnormal CAT scan of her abdomen and pelvis with contrast don mouth. Disp:  Rfl:    Cyanocobalamin (VITAMIN B-12 OR) Take  by mouth. Disp:  Rfl:    Ascorbic Acid (VITAMIN C OR) Take  by mouth. Disp:  Rfl:    Calcium Carbonate (CALCIUM 600 OR) Take  by mouth. Disp:  Rfl:    Denosumab (PROLIA SC) Inject  into the skin. unusual skin lesions or rashes  RESPIRATORY: denies shortness of breath with exertion  CARDIOVASCULAR: denies chest pain on exertion  GI: recent diarrhea and abdominal pain and diagnosed with Salmonellosis colitis and had abnormal CT of abdomen and pelvis dyes, adhesives or any medications. She is medically stable for her MRI of her liver with sedation. She agrees to all discussions and plans today.   Diagnoses and all orders for this visit:    Preoperative general physical examination  Patient medically c

## 2022-08-21 RX ORDER — ROPINIROLE 0.25 MG/1
TABLET, FILM COATED ORAL
Qty: 360 TABLET | Refills: 1 | Status: SHIPPED | OUTPATIENT
Start: 2022-08-21

## 2022-10-10 PROBLEM — M75.41 IMPINGEMENT SYNDROME OF RIGHT SHOULDER: Status: ACTIVE | Noted: 2022-06-14

## 2022-10-10 PROBLEM — M75.101 NONTRAUMATIC TEAR OF RIGHT ROTATOR CUFF: Status: ACTIVE | Noted: 2022-06-14

## 2022-10-12 ENCOUNTER — LAB ENCOUNTER (OUTPATIENT)
Dept: LAB | Age: 72
End: 2022-10-12
Attending: NURSE PRACTITIONER
Payer: MEDICARE

## 2022-10-12 DIAGNOSIS — R10.13 EPIGASTRIC DISCOMFORT: ICD-10-CM

## 2022-10-12 DIAGNOSIS — R11.0 NAUSEA: ICD-10-CM

## 2022-10-12 LAB
ALBUMIN SERPL-MCNC: 3.7 G/DL (ref 3.4–5)
ALBUMIN/GLOB SERPL: 1.2 {RATIO} (ref 1–2)
ALP LIVER SERPL-CCNC: 84 U/L
ALT SERPL-CCNC: 40 U/L
ANION GAP SERPL CALC-SCNC: 7 MMOL/L (ref 0–18)
AST SERPL-CCNC: 23 U/L (ref 15–37)
BASOPHILS # BLD AUTO: 0.06 X10(3) UL (ref 0–0.2)
BASOPHILS NFR BLD AUTO: 1.1 %
BILIRUB SERPL-MCNC: 0.6 MG/DL (ref 0.1–2)
BUN BLD-MCNC: 7 MG/DL (ref 7–18)
CALCIUM BLD-MCNC: 8.4 MG/DL (ref 8.5–10.1)
CHLORIDE SERPL-SCNC: 111 MMOL/L (ref 98–112)
CO2 SERPL-SCNC: 24 MMOL/L (ref 21–32)
CREAT BLD-MCNC: 0.82 MG/DL
EOSINOPHIL # BLD AUTO: 0.18 X10(3) UL (ref 0–0.7)
EOSINOPHIL NFR BLD AUTO: 3.4 %
ERYTHROCYTE [DISTWIDTH] IN BLOOD BY AUTOMATED COUNT: 13.2 %
FASTING STATUS PATIENT QL REPORTED: YES
GFR SERPLBLD BASED ON 1.73 SQ M-ARVRAT: 77 ML/MIN/1.73M2 (ref 60–?)
GLOBULIN PLAS-MCNC: 3.2 G/DL (ref 2.8–4.4)
GLUCOSE BLD-MCNC: 90 MG/DL (ref 70–99)
HCT VFR BLD AUTO: 36.9 %
HGB BLD-MCNC: 12.1 G/DL
IMM GRANULOCYTES # BLD AUTO: 0.01 X10(3) UL (ref 0–1)
IMM GRANULOCYTES NFR BLD: 0.2 %
LYMPHOCYTES # BLD AUTO: 1.55 X10(3) UL (ref 1–4)
LYMPHOCYTES NFR BLD AUTO: 28.9 %
MCH RBC QN AUTO: 29.1 PG (ref 26–34)
MCHC RBC AUTO-ENTMCNC: 32.8 G/DL (ref 31–37)
MCV RBC AUTO: 88.7 FL
MONOCYTES # BLD AUTO: 0.41 X10(3) UL (ref 0.1–1)
MONOCYTES NFR BLD AUTO: 7.6 %
NEUTROPHILS # BLD AUTO: 3.15 X10 (3) UL (ref 1.5–7.7)
NEUTROPHILS # BLD AUTO: 3.15 X10(3) UL (ref 1.5–7.7)
NEUTROPHILS NFR BLD AUTO: 58.8 %
OSMOLALITY SERPL CALC.SUM OF ELEC: 292 MOSM/KG (ref 275–295)
PLATELET # BLD AUTO: 313 10(3)UL (ref 150–450)
POTASSIUM SERPL-SCNC: 3.2 MMOL/L (ref 3.5–5.1)
PROT SERPL-MCNC: 6.9 G/DL (ref 6.4–8.2)
RBC # BLD AUTO: 4.16 X10(6)UL
SODIUM SERPL-SCNC: 142 MMOL/L (ref 136–145)
WBC # BLD AUTO: 5.4 X10(3) UL (ref 4–11)

## 2022-10-12 PROCEDURE — 85025 COMPLETE CBC W/AUTO DIFF WBC: CPT

## 2022-10-12 PROCEDURE — 80053 COMPREHEN METABOLIC PANEL: CPT

## 2022-10-12 PROCEDURE — 36415 COLL VENOUS BLD VENIPUNCTURE: CPT

## 2022-10-17 ENCOUNTER — LAB ENCOUNTER (OUTPATIENT)
Dept: LAB | Facility: HOSPITAL | Age: 72
End: 2022-10-17
Attending: NURSE PRACTITIONER
Payer: MEDICARE

## 2022-10-17 ENCOUNTER — HOSPITAL ENCOUNTER (OUTPATIENT)
Dept: ULTRASOUND IMAGING | Facility: HOSPITAL | Age: 72
Discharge: HOME | End: 2022-10-17
Attending: NURSE PRACTITIONER
Payer: MEDICARE

## 2022-10-17 DIAGNOSIS — R11.0 NAUSEA: ICD-10-CM

## 2022-10-17 DIAGNOSIS — R10.13 EPIGASTRIC DISCOMFORT: ICD-10-CM

## 2022-10-17 DIAGNOSIS — Z01.812 PRE-PROCEDURE LAB EXAM: ICD-10-CM

## 2022-10-17 DIAGNOSIS — R68.81 EARLY SATIETY: ICD-10-CM

## 2022-10-17 LAB — SARS-COV-2 RNA RESP QL NAA+PROBE: NOT DETECTED

## 2022-10-17 PROCEDURE — 76700 US EXAM ABDOM COMPLETE: CPT | Performed by: NURSE PRACTITIONER

## 2022-10-20 ENCOUNTER — HOSPITAL ENCOUNTER (OUTPATIENT)
Dept: GENERAL RADIOLOGY | Age: 72
Discharge: HOME | End: 2022-10-20
Attending: NURSE PRACTITIONER
Payer: MEDICARE

## 2022-10-20 DIAGNOSIS — K44.9 LARGE HIATAL HERNIA: ICD-10-CM

## 2022-10-20 DIAGNOSIS — R13.19 ESOPHAGEAL DYSPHAGIA: ICD-10-CM

## 2022-10-20 DIAGNOSIS — R68.81 EARLY SATIETY: ICD-10-CM

## 2022-10-20 DIAGNOSIS — R11.0 NAUSEA: ICD-10-CM

## 2022-10-20 PROCEDURE — 74246 X-RAY XM UPR GI TRC 2CNTRST: CPT | Performed by: NURSE PRACTITIONER

## 2022-10-26 ENCOUNTER — LAB ENCOUNTER (OUTPATIENT)
Dept: LAB | Age: 72
End: 2022-10-26
Attending: NURSE PRACTITIONER
Payer: MEDICARE

## 2022-10-26 DIAGNOSIS — R19.5 LOOSE STOOLS: ICD-10-CM

## 2022-10-26 PROCEDURE — 87493 C DIFF AMPLIFIED PROBE: CPT

## 2022-10-27 LAB — C DIFF TOX B STL QL: NEGATIVE

## 2022-11-03 ENCOUNTER — OFFICE VISIT (OUTPATIENT)
Facility: LOCATION | Age: 72
End: 2022-11-03
Payer: MEDICARE

## 2022-11-03 VITALS — TEMPERATURE: 97 F | HEART RATE: 92 BPM

## 2022-11-03 DIAGNOSIS — K80.12 CALCULUS OF GALLBLADDER WITH ACUTE ON CHRONIC CHOLECYSTITIS WITHOUT OBSTRUCTION: ICD-10-CM

## 2022-11-03 DIAGNOSIS — K80.10 CALCULUS OF GALLBLADDER WITH CHOLECYSTITIS WITHOUT BILIARY OBSTRUCTION, UNSPECIFIED CHOLECYSTITIS ACUITY: Primary | ICD-10-CM

## 2022-11-04 ENCOUNTER — LAB ENCOUNTER (OUTPATIENT)
Dept: LAB | Age: 72
End: 2022-11-04
Attending: SURGERY
Payer: MEDICARE

## 2022-11-04 ENCOUNTER — ANESTHESIA EVENT (OUTPATIENT)
Dept: SURGERY | Facility: HOSPITAL | Age: 72
End: 2022-11-04
Payer: MEDICARE

## 2022-11-04 DIAGNOSIS — Z01.812 ENCOUNTER FOR PREOPERATIVE SCREENING LABORATORY TESTING FOR COVID-19 VIRUS: ICD-10-CM

## 2022-11-04 DIAGNOSIS — K80.10 CALCULUS OF GALLBLADDER WITH CHOLECYSTITIS WITHOUT BILIARY OBSTRUCTION, UNSPECIFIED CHOLECYSTITIS ACUITY: ICD-10-CM

## 2022-11-04 DIAGNOSIS — Z20.822 ENCOUNTER FOR PREOPERATIVE SCREENING LABORATORY TESTING FOR COVID-19 VIRUS: ICD-10-CM

## 2022-11-05 LAB — SARS-COV-2 RNA RESP QL NAA+PROBE: NOT DETECTED

## 2022-11-07 ENCOUNTER — HOSPITAL ENCOUNTER (OUTPATIENT)
Facility: HOSPITAL | Age: 72
Setting detail: HOSPITAL OUTPATIENT SURGERY
Discharge: HOME | End: 2022-11-07
Attending: SURGERY | Admitting: SURGERY
Payer: MEDICARE

## 2022-11-07 ENCOUNTER — ANESTHESIA (OUTPATIENT)
Dept: SURGERY | Facility: HOSPITAL | Age: 72
End: 2022-11-07
Payer: MEDICARE

## 2022-11-07 ENCOUNTER — APPOINTMENT (OUTPATIENT)
Dept: GENERAL RADIOLOGY | Facility: HOSPITAL | Age: 72
End: 2022-11-07
Attending: SURGERY
Payer: MEDICARE

## 2022-11-07 VITALS
OXYGEN SATURATION: 96 % | TEMPERATURE: 99 F | BODY MASS INDEX: 25.34 KG/M2 | SYSTOLIC BLOOD PRESSURE: 160 MMHG | HEIGHT: 63 IN | DIASTOLIC BLOOD PRESSURE: 78 MMHG | RESPIRATION RATE: 20 BRPM | HEART RATE: 73 BPM | WEIGHT: 143 LBS

## 2022-11-07 DIAGNOSIS — K82.8 GALLBLADDER SLUDGE: ICD-10-CM

## 2022-11-07 DIAGNOSIS — Z01.812 ENCOUNTER FOR PREOPERATIVE SCREENING LABORATORY TESTING FOR COVID-19 VIRUS: Primary | ICD-10-CM

## 2022-11-07 DIAGNOSIS — K80.10 CALCULUS OF GALLBLADDER WITH CHOLECYSTITIS WITHOUT BILIARY OBSTRUCTION, UNSPECIFIED CHOLECYSTITIS ACUITY: ICD-10-CM

## 2022-11-07 DIAGNOSIS — Z20.822 ENCOUNTER FOR PREOPERATIVE SCREENING LABORATORY TESTING FOR COVID-19 VIRUS: Primary | ICD-10-CM

## 2022-11-07 LAB
CHLORIDE SERPL-SCNC: 111 MMOL/L (ref 98–112)
CO2 SERPL-SCNC: 26 MMOL/L (ref 21–32)
POTASSIUM SERPL-SCNC: 3.7 MMOL/L (ref 3.5–5.1)
SODIUM SERPL-SCNC: 140 MMOL/L (ref 136–145)

## 2022-11-07 PROCEDURE — BF030ZZ PLAIN RADIOGRAPHY OF GALLBLADDER AND BILE DUCTS USING HIGH OSMOLAR CONTRAST: ICD-10-PCS | Performed by: SURGERY

## 2022-11-07 PROCEDURE — 88304 TISSUE EXAM BY PATHOLOGIST: CPT | Performed by: SURGERY

## 2022-11-07 PROCEDURE — 74300 X-RAY BILE DUCTS/PANCREAS: CPT | Performed by: SURGERY

## 2022-11-07 PROCEDURE — 80051 ELECTROLYTE PANEL: CPT | Performed by: ANESTHESIOLOGY

## 2022-11-07 PROCEDURE — 0FT44ZZ RESECTION OF GALLBLADDER, PERCUTANEOUS ENDOSCOPIC APPROACH: ICD-10-PCS | Performed by: SURGERY

## 2022-11-07 PROCEDURE — 0DNU4ZZ RELEASE OMENTUM, PERCUTANEOUS ENDOSCOPIC APPROACH: ICD-10-PCS | Performed by: SURGERY

## 2022-11-07 RX ORDER — ROCURONIUM BROMIDE 10 MG/ML
INJECTION, SOLUTION INTRAVENOUS AS NEEDED
Status: DISCONTINUED | OUTPATIENT
Start: 2022-11-07 | End: 2022-11-07 | Stop reason: SURG

## 2022-11-07 RX ORDER — ACETAMINOPHEN 500 MG
1000 TABLET ORAL ONCE
Status: DISCONTINUED | OUTPATIENT
Start: 2022-11-07 | End: 2022-11-07 | Stop reason: HOSPADM

## 2022-11-07 RX ORDER — HYDROMORPHONE HYDROCHLORIDE 1 MG/ML
0.2 INJECTION, SOLUTION INTRAMUSCULAR; INTRAVENOUS; SUBCUTANEOUS EVERY 5 MIN PRN
Status: DISCONTINUED | OUTPATIENT
Start: 2022-11-07 | End: 2022-11-07

## 2022-11-07 RX ORDER — HYDROMORPHONE HYDROCHLORIDE 1 MG/ML
0.6 INJECTION, SOLUTION INTRAMUSCULAR; INTRAVENOUS; SUBCUTANEOUS EVERY 5 MIN PRN
Status: DISCONTINUED | OUTPATIENT
Start: 2022-11-07 | End: 2022-11-07

## 2022-11-07 RX ORDER — NALOXONE HYDROCHLORIDE 0.4 MG/ML
80 INJECTION, SOLUTION INTRAMUSCULAR; INTRAVENOUS; SUBCUTANEOUS AS NEEDED
Status: DISCONTINUED | OUTPATIENT
Start: 2022-11-07 | End: 2022-11-07

## 2022-11-07 RX ORDER — HYDROCODONE BITARTRATE AND ACETAMINOPHEN 5; 325 MG/1; MG/1
1 TABLET ORAL ONCE AS NEEDED
Status: DISCONTINUED | OUTPATIENT
Start: 2022-11-07 | End: 2022-11-07

## 2022-11-07 RX ORDER — ONDANSETRON 2 MG/ML
4 INJECTION INTRAMUSCULAR; INTRAVENOUS EVERY 6 HOURS PRN
Status: DISCONTINUED | OUTPATIENT
Start: 2022-11-07 | End: 2022-11-07

## 2022-11-07 RX ORDER — HYDROCODONE BITARTRATE AND ACETAMINOPHEN 5; 325 MG/1; MG/1
1 TABLET ORAL EVERY 6 HOURS PRN
Qty: 20 TABLET | Refills: 0 | Status: SHIPPED | OUTPATIENT
Start: 2022-11-07

## 2022-11-07 RX ORDER — ONDANSETRON 2 MG/ML
INJECTION INTRAMUSCULAR; INTRAVENOUS
Status: COMPLETED
Start: 2022-11-07 | End: 2022-11-07

## 2022-11-07 RX ORDER — HEPARIN SODIUM 5000 [USP'U]/ML
5000 INJECTION, SOLUTION INTRAVENOUS; SUBCUTANEOUS ONCE
Status: COMPLETED | OUTPATIENT
Start: 2022-11-07 | End: 2022-11-07

## 2022-11-07 RX ORDER — HYDROMORPHONE HYDROCHLORIDE 1 MG/ML
0.4 INJECTION, SOLUTION INTRAMUSCULAR; INTRAVENOUS; SUBCUTANEOUS EVERY 5 MIN PRN
Status: DISCONTINUED | OUTPATIENT
Start: 2022-11-07 | End: 2022-11-07

## 2022-11-07 RX ORDER — BUPIVACAINE HYDROCHLORIDE AND EPINEPHRINE 5; 5 MG/ML; UG/ML
INJECTION, SOLUTION EPIDURAL; INTRACAUDAL; PERINEURAL AS NEEDED
Status: DISCONTINUED | OUTPATIENT
Start: 2022-11-07 | End: 2022-11-07 | Stop reason: HOSPADM

## 2022-11-07 RX ORDER — NEOSTIGMINE METHYLSULFATE 1 MG/ML
INJECTION, SOLUTION INTRAVENOUS AS NEEDED
Status: DISCONTINUED | OUTPATIENT
Start: 2022-11-07 | End: 2022-11-07 | Stop reason: SURG

## 2022-11-07 RX ORDER — DIPHENHYDRAMINE HYDROCHLORIDE 50 MG/ML
12.5 INJECTION INTRAMUSCULAR; INTRAVENOUS AS NEEDED
Status: DISCONTINUED | OUTPATIENT
Start: 2022-11-07 | End: 2022-11-07

## 2022-11-07 RX ORDER — DEXAMETHASONE SODIUM PHOSPHATE 4 MG/ML
VIAL (ML) INJECTION AS NEEDED
Status: DISCONTINUED | OUTPATIENT
Start: 2022-11-07 | End: 2022-11-07 | Stop reason: SURG

## 2022-11-07 RX ORDER — LABETALOL HYDROCHLORIDE 5 MG/ML
10 INJECTION, SOLUTION INTRAVENOUS EVERY 10 MIN PRN
Status: DISCONTINUED | OUTPATIENT
Start: 2022-11-07 | End: 2022-11-07

## 2022-11-07 RX ORDER — SODIUM CHLORIDE, SODIUM LACTATE, POTASSIUM CHLORIDE, CALCIUM CHLORIDE 600; 310; 30; 20 MG/100ML; MG/100ML; MG/100ML; MG/100ML
INJECTION, SOLUTION INTRAVENOUS CONTINUOUS
Status: DISCONTINUED | OUTPATIENT
Start: 2022-11-07 | End: 2022-11-07

## 2022-11-07 RX ORDER — METOCLOPRAMIDE HYDROCHLORIDE 5 MG/ML
10 INJECTION INTRAMUSCULAR; INTRAVENOUS EVERY 8 HOURS PRN
Status: DISCONTINUED | OUTPATIENT
Start: 2022-11-07 | End: 2022-11-07

## 2022-11-07 RX ORDER — ONDANSETRON 2 MG/ML
INJECTION INTRAMUSCULAR; INTRAVENOUS AS NEEDED
Status: DISCONTINUED | OUTPATIENT
Start: 2022-11-07 | End: 2022-11-07 | Stop reason: SURG

## 2022-11-07 RX ORDER — ACETAMINOPHEN 500 MG
1000 TABLET ORAL ONCE AS NEEDED
Status: DISCONTINUED | OUTPATIENT
Start: 2022-11-07 | End: 2022-11-07

## 2022-11-07 RX ORDER — HYDROCODONE BITARTRATE AND ACETAMINOPHEN 5; 325 MG/1; MG/1
2 TABLET ORAL ONCE AS NEEDED
Status: DISCONTINUED | OUTPATIENT
Start: 2022-11-07 | End: 2022-11-07

## 2022-11-07 RX ORDER — GLYCOPYRROLATE 0.2 MG/ML
INJECTION, SOLUTION INTRAMUSCULAR; INTRAVENOUS AS NEEDED
Status: DISCONTINUED | OUTPATIENT
Start: 2022-11-07 | End: 2022-11-07 | Stop reason: SURG

## 2022-11-07 RX ORDER — LIDOCAINE HYDROCHLORIDE 10 MG/ML
INJECTION, SOLUTION EPIDURAL; INFILTRATION; INTRACAUDAL; PERINEURAL AS NEEDED
Status: DISCONTINUED | OUTPATIENT
Start: 2022-11-07 | End: 2022-11-07 | Stop reason: SURG

## 2022-11-07 RX ADMIN — GLYCOPYRROLATE 0.4 MG: 0.2 INJECTION, SOLUTION INTRAMUSCULAR; INTRAVENOUS at 15:28:00

## 2022-11-07 RX ADMIN — NEOSTIGMINE METHYLSULFATE 3 MG: 1 INJECTION, SOLUTION INTRAVENOUS at 15:28:00

## 2022-11-07 RX ADMIN — LIDOCAINE HYDROCHLORIDE 50 MG: 10 INJECTION, SOLUTION EPIDURAL; INFILTRATION; INTRACAUDAL; PERINEURAL at 14:17:00

## 2022-11-07 RX ADMIN — SODIUM CHLORIDE, SODIUM LACTATE, POTASSIUM CHLORIDE, CALCIUM CHLORIDE: 600; 310; 30; 20 INJECTION, SOLUTION INTRAVENOUS at 15:39:00

## 2022-11-07 RX ADMIN — DEXAMETHASONE SODIUM PHOSPHATE 8 MG: 4 MG/ML VIAL (ML) INJECTION at 14:25:00

## 2022-11-07 RX ADMIN — SODIUM CHLORIDE, SODIUM LACTATE, POTASSIUM CHLORIDE, CALCIUM CHLORIDE: 600; 310; 30; 20 INJECTION, SOLUTION INTRAVENOUS at 14:13:00

## 2022-11-07 RX ADMIN — ONDANSETRON 4 MG: 2 INJECTION INTRAMUSCULAR; INTRAVENOUS at 14:25:00

## 2022-11-07 RX ADMIN — ROCURONIUM BROMIDE 35 MG: 10 INJECTION, SOLUTION INTRAVENOUS at 14:17:00

## 2022-11-07 NOTE — INTERVAL H&P NOTE
Pre-op Diagnosis: Calculus of gallbladder with cholecystitis without biliary obstruction, unspecified cholecystitis acuity [K80.10]    The above referenced H&P was reviewed by Mart Ramirez MD on 11/7/2022, the patient was examined and no significant changes have occurred in the patient's condition since the H&P was performed. I discussed with the patient and/or legal representative the potential benefits, risks and side effects of this procedure; the likelihood of the patient achieving goals; and potential problems that might occur during recuperation. I discussed reasonable alternatives to the procedure, including risks, benefits and side effects related to the alternatives and risks related to not receiving this procedure. We will proceed with procedure as planned. The above referenced H&P was reviewed by Sammy Cho MD, on 5/31/2013, and no significant changes have occurred in the patient's condition and/or examination since the H&P was performed. Potential treatment options and risks and benefits of surgery where reviewed at bedside with pt and family/friends and they have no questions a t this time and wish to proceed with surgery today.

## 2022-11-07 NOTE — ANESTHESIA PROCEDURE NOTES
Airway  Date/Time: 11/7/2022 2:19 PM  Urgency: elective      General Information and Staff    Patient location during procedure: OR  Anesthesiologist: Tiara Henderson MD  Performed: anesthesiologist     Indications and Patient Condition  Indications for airway management: anesthesia  Sedation level: deep  Preoxygenated: yes  Patient position: sniffing  Mask difficulty assessment: 1 - vent by mask    Final Airway Details  Final airway type: endotracheal airway      Successful airway: ETT  Cuffed: yes   Successful intubation technique: direct laryngoscopy  Facilitating devices/methods: cricoid pressure  Endotracheal tube insertion site: oral  Blade: Patsy  Blade size: #3  ETT size (mm): 7.0    Cormack-Lehane Classification: grade IIB - view of arytenoids or posterior of glottis only  Placement verified by: chest auscultation and capnometry   Measured from: lips  ETT to lips (cm): 21  Number of attempts at approach: 1    Additional Comments  atraumatic

## 2022-11-07 NOTE — ANESTHESIA POSTPROCEDURE EVALUATION
[address] Patient Status:  Hospital Outpatient Surgery   Age/Gender 81year old female MRN [id number]   [facility] SURGERY Attending Abimael Pollard MD   Hosp Day # 0 PCP Doug Brown MD       Anesthesia Post-op Note    LAPAROSCOPIC CHOLECYSTECTOMY WITH CHOLANGIOGRAM    Procedure Summary     Date: 11/07/22 Room / Location: [address] [place] [address] OR 06 / 1404 [facility] OR    Anesthesia Start: 7276 Anesthesia Stop: 7956    Procedure: LAPAROSCOPIC CHOLECYSTECTOMY WITH CHOLANGIOGRAM (Abdomen) Diagnosis:       Gallbladder sludge      (Gallbladder Sludge)    Surgeons: Abimael Pollard MD Anesthesiologist: Conor Dalal MD    Anesthesia Type: general ASA Status: 2          Anesthesia Type: general    Vitals Value Taken Time   /67 11/07/22 1540   Temp 97.8 11/07/22 1540   Pulse 74 11/07/22 1540   Resp 16 11/07/22 1540   SpO2 98 11/07/22 1540       Patient Location: PACU    Anesthesia Type: general    Airway Patency: patent    Postop Pain Control: adequate    Mental Status: preanesthetic baseline    Nausea/Vomiting: none    Cardiopulmonary/Hydration status: stable euvolemic    Complications: no apparent anesthesia related complications    Postop vital signs: stable    Dental Exam: Unchanged from Preop    Patient to be discharged from PACU when criteria met.

## 2022-11-15 ENCOUNTER — OFFICE VISIT (OUTPATIENT)
Facility: LOCATION | Age: 72
End: 2022-11-15

## 2022-11-15 VITALS — TEMPERATURE: 98 F | HEART RATE: 62 BPM

## 2022-11-15 DIAGNOSIS — Z90.49 S/P LAPAROSCOPIC CHOLECYSTECTOMY: ICD-10-CM

## 2022-11-15 DIAGNOSIS — K80.12 CALCULUS OF GALLBLADDER WITH ACUTE ON CHRONIC CHOLECYSTITIS WITHOUT OBSTRUCTION: Primary | ICD-10-CM

## 2022-11-15 PROCEDURE — 99024 POSTOP FOLLOW-UP VISIT: CPT | Performed by: SURGERY

## 2022-11-27 ENCOUNTER — OFFICE VISIT (OUTPATIENT)
Dept: FAMILY MEDICINE CLINIC | Facility: CLINIC | Age: 72
End: 2022-11-27
Payer: MEDICARE

## 2022-11-27 VITALS
TEMPERATURE: 98 F | HEIGHT: 63 IN | WEIGHT: 149 LBS | RESPIRATION RATE: 16 BRPM | SYSTOLIC BLOOD PRESSURE: 128 MMHG | HEART RATE: 81 BPM | BODY MASS INDEX: 26.4 KG/M2 | DIASTOLIC BLOOD PRESSURE: 88 MMHG | OXYGEN SATURATION: 99 %

## 2022-11-27 DIAGNOSIS — H66.92 LEFT ACUTE OTITIS MEDIA: Primary | ICD-10-CM

## 2022-11-27 PROCEDURE — 99213 OFFICE O/P EST LOW 20 MIN: CPT | Performed by: NURSE PRACTITIONER

## 2022-11-27 RX ORDER — AMOXICILLIN 500 MG/1
500 CAPSULE ORAL 2 TIMES DAILY
Qty: 20 CAPSULE | Refills: 0 | Status: SHIPPED | OUTPATIENT
Start: 2022-11-27 | End: 2022-12-07

## 2022-11-27 NOTE — PATIENT INSTRUCTIONS
PLAN: Amoxicillin, take as directed. Finish all the medication even if you feel better. Probiotics or yogurt daily during antibiotic use will help decrease stomach upset and restore good bacteria to the gut. Take at lunch time. Warm pack to outer ear for comfort. Hot steam inhalation may help ear pain. May use Tylenol or Ibuprofen over the counter for pain/comfort if not contraindicated. Follow up in 2 weeks with Dr. Paul Hernandez if not improving, or sooner if worsening symptoms.

## 2022-12-02 ENCOUNTER — OFFICE VISIT (OUTPATIENT)
Dept: FAMILY MEDICINE CLINIC | Facility: CLINIC | Age: 72
End: 2022-12-02
Payer: MEDICARE

## 2022-12-02 VITALS
TEMPERATURE: 97 F | HEART RATE: 69 BPM | BODY MASS INDEX: 26.22 KG/M2 | DIASTOLIC BLOOD PRESSURE: 80 MMHG | WEIGHT: 148 LBS | SYSTOLIC BLOOD PRESSURE: 132 MMHG | OXYGEN SATURATION: 98 % | HEIGHT: 63 IN | RESPIRATION RATE: 16 BRPM

## 2022-12-02 DIAGNOSIS — G47.00 INSOMNIA, UNSPECIFIED TYPE: ICD-10-CM

## 2022-12-02 DIAGNOSIS — M25.519 SHOULDER PAIN WITH HISTORY OF REPAIR OF ROTATOR CUFF: ICD-10-CM

## 2022-12-02 DIAGNOSIS — Z98.890 SHOULDER PAIN WITH HISTORY OF REPAIR OF ROTATOR CUFF: ICD-10-CM

## 2022-12-02 DIAGNOSIS — E87.8 ELECTROLYTE ABNORMALITY: Primary | ICD-10-CM

## 2022-12-02 RX ORDER — DIPHENHYDRAMINE HCL 50 MG
1 CAPSULE ORAL NIGHTLY
Qty: 30 CAPSULE | Refills: 2 | Status: SHIPPED | OUTPATIENT
Start: 2022-12-02 | End: 2023-01-01

## 2022-12-02 RX ORDER — METHYLPREDNISOLONE 4 MG/1
TABLET ORAL
Qty: 1 EACH | Refills: 0 | Status: SHIPPED | OUTPATIENT
Start: 2022-12-02

## 2022-12-20 ENCOUNTER — LAB ENCOUNTER (OUTPATIENT)
Dept: LAB | Age: 72
End: 2022-12-20
Attending: FAMILY MEDICINE
Payer: MEDICARE

## 2022-12-20 DIAGNOSIS — E87.8 ELECTROLYTE ABNORMALITY: ICD-10-CM

## 2022-12-20 LAB
ALBUMIN SERPL-MCNC: 3.8 G/DL (ref 3.4–5)
ALBUMIN/GLOB SERPL: 1.4 {RATIO} (ref 1–2)
ALP LIVER SERPL-CCNC: 80 U/L
ALT SERPL-CCNC: 23 U/L
ANION GAP SERPL CALC-SCNC: 3 MMOL/L (ref 0–18)
AST SERPL-CCNC: 21 U/L (ref 15–37)
BASOPHILS # BLD AUTO: 0.04 X10(3) UL (ref 0–0.2)
BASOPHILS NFR BLD AUTO: 0.6 %
BILIRUB SERPL-MCNC: 0.6 MG/DL (ref 0.1–2)
BUN BLD-MCNC: 10 MG/DL (ref 7–18)
CALCIUM BLD-MCNC: 9.9 MG/DL (ref 8.5–10.1)
CHLORIDE SERPL-SCNC: 109 MMOL/L (ref 98–112)
CO2 SERPL-SCNC: 28 MMOL/L (ref 21–32)
CREAT BLD-MCNC: 0.95 MG/DL
EOSINOPHIL # BLD AUTO: 0.3 X10(3) UL (ref 0–0.7)
EOSINOPHIL NFR BLD AUTO: 4.5 %
ERYTHROCYTE [DISTWIDTH] IN BLOOD BY AUTOMATED COUNT: 13.1 %
FASTING STATUS PATIENT QL REPORTED: YES
GFR SERPLBLD BASED ON 1.73 SQ M-ARVRAT: 64 ML/MIN/1.73M2 (ref 60–?)
GLOBULIN PLAS-MCNC: 2.8 G/DL (ref 2.8–4.4)
GLUCOSE BLD-MCNC: 96 MG/DL (ref 70–99)
HCT VFR BLD AUTO: 42.6 %
HGB BLD-MCNC: 13.7 G/DL
IMM GRANULOCYTES # BLD AUTO: 0.02 X10(3) UL (ref 0–1)
IMM GRANULOCYTES NFR BLD: 0.3 %
LYMPHOCYTES # BLD AUTO: 1.82 X10(3) UL (ref 1–4)
LYMPHOCYTES NFR BLD AUTO: 27.3 %
MCH RBC QN AUTO: 29 PG (ref 26–34)
MCHC RBC AUTO-ENTMCNC: 32.2 G/DL (ref 31–37)
MCV RBC AUTO: 90.1 FL
MONOCYTES # BLD AUTO: 0.51 X10(3) UL (ref 0.1–1)
MONOCYTES NFR BLD AUTO: 7.6 %
NEUTROPHILS # BLD AUTO: 3.98 X10 (3) UL (ref 1.5–7.7)
NEUTROPHILS # BLD AUTO: 3.98 X10(3) UL (ref 1.5–7.7)
NEUTROPHILS NFR BLD AUTO: 59.7 %
OSMOLALITY SERPL CALC.SUM OF ELEC: 289 MOSM/KG (ref 275–295)
PLATELET # BLD AUTO: 286 10(3)UL (ref 150–450)
POTASSIUM SERPL-SCNC: 4.1 MMOL/L (ref 3.5–5.1)
PROT SERPL-MCNC: 6.6 G/DL (ref 6.4–8.2)
RBC # BLD AUTO: 4.73 X10(6)UL
SODIUM SERPL-SCNC: 140 MMOL/L (ref 136–145)
WBC # BLD AUTO: 6.7 X10(3) UL (ref 4–11)

## 2022-12-20 PROCEDURE — 36415 COLL VENOUS BLD VENIPUNCTURE: CPT

## 2022-12-20 PROCEDURE — 80053 COMPREHEN METABOLIC PANEL: CPT

## 2022-12-20 PROCEDURE — 85025 COMPLETE CBC W/AUTO DIFF WBC: CPT

## 2023-01-25 NOTE — TELEPHONE ENCOUNTER
Letter of approval for the Cyclobenzaprine 5 mg tabs was received via fax from [facility]. Coverage effective 06/20/18 through 09/18/2019. [facility] pharmacy notified of the approval & they will  Notify patient. Scalpel Size: 10 blade

## 2023-03-27 ENCOUNTER — TELEPHONE (OUTPATIENT)
Dept: FAMILY MEDICINE CLINIC | Facility: CLINIC | Age: 73
End: 2023-03-27

## 2023-03-27 RX ORDER — TRAZODONE HYDROCHLORIDE 50 MG/1
50 TABLET ORAL NIGHTLY PRN
Qty: 30 TABLET | Refills: 0 | Status: SHIPPED | OUTPATIENT
Start: 2023-03-27

## 2023-03-27 NOTE — TELEPHONE ENCOUNTER
Trazodone sent to pharmacy for sleep. This was requested by her  who I saw in the office. I did ask him to have [place] call me if still with difficulty sleeping.

## 2023-04-19 RX ORDER — TRAZODONE HYDROCHLORIDE 50 MG/1
TABLET ORAL
Qty: 30 TABLET | Refills: 0 | Status: SHIPPED | OUTPATIENT
Start: 2023-04-19

## 2023-04-19 RX ORDER — ROPINIROLE 0.25 MG/1
1 TABLET, FILM COATED ORAL 4 TIMES DAILY
Qty: 360 TABLET | Refills: 1 | Status: SHIPPED | OUTPATIENT
Start: 2023-04-19

## 2023-05-18 RX ORDER — TRAZODONE HYDROCHLORIDE 50 MG/1
TABLET ORAL
Qty: 30 TABLET | Refills: 0 | Status: SHIPPED | OUTPATIENT
Start: 2023-05-18

## 2023-05-30 ENCOUNTER — OFFICE VISIT (OUTPATIENT)
Dept: FAMILY MEDICINE CLINIC | Facility: CLINIC | Age: 73
End: 2023-05-30
Payer: MEDICARE

## 2023-05-30 VITALS
DIASTOLIC BLOOD PRESSURE: 84 MMHG | WEIGHT: 140 LBS | HEIGHT: 63 IN | OXYGEN SATURATION: 98 % | HEART RATE: 73 BPM | TEMPERATURE: 97 F | SYSTOLIC BLOOD PRESSURE: 130 MMHG | BODY MASS INDEX: 24.8 KG/M2 | RESPIRATION RATE: 16 BRPM

## 2023-05-30 DIAGNOSIS — L42 PITYRIASIS ROSEA: Primary | ICD-10-CM

## 2023-05-30 PROCEDURE — 99213 OFFICE O/P EST LOW 20 MIN: CPT | Performed by: NURSE PRACTITIONER

## 2023-05-30 RX ORDER — METHYLPREDNISOLONE 4 MG/1
TABLET ORAL
Qty: 1 EACH | Refills: 0 | Status: SHIPPED | OUTPATIENT
Start: 2023-05-30 | End: 2023-06-01

## 2023-05-30 NOTE — PATIENT INSTRUCTIONS
Take Medrol dose pack as discussed  Keep well lotioned up to avoid scratching  Follow up with Dr. Waldo Meckel if symptoms persist longer than three weeks

## 2023-06-01 ENCOUNTER — OFFICE VISIT (OUTPATIENT)
Dept: FAMILY MEDICINE CLINIC | Facility: CLINIC | Age: 73
End: 2023-06-01
Payer: MEDICARE

## 2023-06-01 ENCOUNTER — TELEPHONE (OUTPATIENT)
Dept: ORTHOPEDICS CLINIC | Facility: CLINIC | Age: 73
End: 2023-06-01

## 2023-06-01 VITALS
BODY MASS INDEX: 26.22 KG/M2 | OXYGEN SATURATION: 99 % | RESPIRATION RATE: 16 BRPM | SYSTOLIC BLOOD PRESSURE: 124 MMHG | HEIGHT: 63 IN | DIASTOLIC BLOOD PRESSURE: 70 MMHG | WEIGHT: 148 LBS | HEART RATE: 72 BPM | TEMPERATURE: 98 F

## 2023-06-01 DIAGNOSIS — Z13.0 SCREENING FOR ENDOCRINE, METABOLIC AND IMMUNITY DISORDER: ICD-10-CM

## 2023-06-01 DIAGNOSIS — Z13.29 SCREENING FOR ENDOCRINE, METABOLIC AND IMMUNITY DISORDER: ICD-10-CM

## 2023-06-01 DIAGNOSIS — M79.644 PAIN OF RIGHT MIDDLE FINGER: Primary | ICD-10-CM

## 2023-06-01 DIAGNOSIS — G47.00 INSOMNIA, UNSPECIFIED TYPE: ICD-10-CM

## 2023-06-01 DIAGNOSIS — Z13.228 SCREENING FOR ENDOCRINE, METABOLIC AND IMMUNITY DISORDER: ICD-10-CM

## 2023-06-01 DIAGNOSIS — L98.9 SKIN LESION: Primary | ICD-10-CM

## 2023-06-01 DIAGNOSIS — Z12.31 ENCOUNTER FOR SCREENING MAMMOGRAM FOR MALIGNANT NEOPLASM OF BREAST: ICD-10-CM

## 2023-06-01 PROCEDURE — 99214 OFFICE O/P EST MOD 30 MIN: CPT | Performed by: FAMILY MEDICINE

## 2023-06-01 RX ORDER — TRAZODONE HYDROCHLORIDE 100 MG/1
100 TABLET ORAL NIGHTLY
Qty: 90 TABLET | Refills: 3 | Status: SHIPPED | OUTPATIENT
Start: 2023-06-01 | End: 2024-05-26

## 2023-06-01 NOTE — PROGRESS NOTES
Subjective:   Patient ID: Rony Higgins is a 72year old female. HPI  Ms. Cuba Soto is a very pleasant 72-year-old female with known history insomnia, osteoporosis, osteoarthritis presenting today for a lesion off her left middle finger which she has had for some time but she did notice a black spot but she is not sure if she had a splinter in there. However it would be painful at this point. No recent injury or trauma. She also is requesting for trazodone to be increased. She does have a type A personality and will pain days ahead of her in anticipation. Despite taking 50 mg of trazodone she has not been sleeping well. I had reviewed past medical and family histories together with allergy and medication lists documented. History/Other:   Review of Systems   Constitutional:  Negative for fatigue and fever. HENT:  Negative for sore throat and trouble swallowing. Respiratory:  Negative for cough and shortness of breath. Cardiovascular:  Negative for chest pain. Gastrointestinal:  Negative for abdominal pain, diarrhea, nausea and vomiting. Neurological:  Negative for dizziness and weakness. Current Outpatient Medications   Medication Sig Dispense Refill    traZODone 100 MG Oral Tab Take 1 tablet (100 mg total) by mouth nightly. 90 tablet 3    rOPINIRole 0.25 MG Oral Tab Take 4 tablets (1 mg total) by mouth 4 (four) times daily. 360 tablet 1    omeprazole 20 MG Oral Capsule Delayed Release Take one capsule (20 mg total) by mouth once daily, 30 minutes prior to breakfast. 90 capsule 3    MELOXICAM 15 MG Oral Tab TAKE 1 TABLET BY MOUTH EVERY DAY 90 tablet 0    Denosumab 60 MG/ML Subcutaneous Solution Prefilled Syringe Inject 1 mL (60 mg total) into the skin every 6 (six) months. Historical documentation - see Epic Immunization Activity for administration details 1 mL 0    Cholecalciferol (VITAMIN D) 2000 UNITS Oral Cap Take 1 capsule (2,000 Units total) by mouth daily.       Multiple Vitamin (MULTI-VITAMIN DAILY OR) Take 1 tablet by mouth daily. Ascorbic Acid (VITAMIN C OR) Take 1 tablet by mouth daily. Calcium Carbonate (CALCIUM 600 OR) Take 1 tablet by mouth daily. Allergies:  Dust Mites              ITCHING    Objective:   Physical Exam  Vitals reviewed. Constitutional:       General: She is not in acute distress. HENT:      Right Ear: Tympanic membrane and ear canal normal.      Left Ear: Tympanic membrane and ear canal normal.      Mouth/Throat:      Mouth: Mucous membranes are moist.      Pharynx: Oropharynx is clear. Eyes:      General: No scleral icterus. Conjunctiva/sclera: Conjunctivae normal.   Cardiovascular:      Rate and Rhythm: Normal rate and regular rhythm. Heart sounds: Normal heart sounds. No murmur heard. Pulmonary:      Effort: Pulmonary effort is normal. No respiratory distress. Breath sounds: Normal breath sounds. No wheezing or rales. Abdominal:      General: Bowel sounds are normal. There is no distension. Palpations: Abdomen is soft. Musculoskeletal:        Hands:       Cervical back: Neck supple. Right lower leg: No edema. Left lower leg: No edema. Comments: Small fluid-filled blister noted on the distal tip of the left third finger posterior to the nail which was nonerythematous and nontender. This measured approximately 2 mm in size. Intact sensation. Lymphadenopathy:      Cervical: No cervical adenopathy. Skin:     General: Skin is warm. Neurological:      Mental Status: She is alert.    Psychiatric:         Mood and Affect: Mood normal.         Behavior: Behavior normal.         Assessment & Plan:   Skin lesion  (primary encounter diagnosis)  -Likely benign; I would refer her to hand orthopedics for further evaluation and management  Encounter for screening mammogram for malignant neoplasm of breast  Insomnia, unspecified type  -Will increase trazodone to 100 mg 1 tablet at night  Screening for endocrine, metabolic and immunity disorder    Orders Placed This Encounter      CBC W Differential W Platelet [E]      Comp Metabolic Panel (14) [E]      Lipid Panel [E]      TSH and Free T4 [E]  Follow-up in 6 months for her next wellness exam  I did order labs for her and we will notify her once we get test results      This note was prepared using Merlin voice recognition dictation software. As a result errors may occur. When identified these errors have been corrected. While every attempt is made to correct errors during dictation discrepancies may still exist            Meds This Visit:  Requested Prescriptions     Signed Prescriptions Disp Refills    traZODone 100 MG Oral Tab 90 tablet 3     Sig: Take 1 tablet (100 mg total) by mouth nightly.        Imaging & Referrals:  ORTHOPEDIC - INTERNAL  FRANCHES[facility] SCREENING BILAT (CPT=77067)

## 2023-06-01 NOTE — PATIENT INSTRUCTIONS
Thank you for choosing Roel Rodriguez MD at [place] [address]  To Do: Raghuhimanshu BELLE Renate  1. Please take meds as directed. [facility] is located in [address]. Monday, Tuesday & Friday - 8 a.m. to 4 p.m. Wednesday, Thursday - 7 a.m. to 3 p.m. The lab is closed daily from 12 p.m.-12:30 p.m. Saturday lab hours by appointment. Call [phone] to schedule the appointment. Please signup for Stylefie, which is electronic access to your record if you have not done so. All your results will post on there. [email or web address]   You can NOW use Stylefie to book your appointments with us, or consider using open access scheduling which is through the edward website [email or web address] and type in Roel Rodriguez MD and follow the links for \"Schedule Online Now\"    To schedule Imaging or tests at [facility] Scheduling [phone], Go to [facility] A ER Building (For example: CT scans, X rays, Ultrasound, MRI)  Cardiac Testing in ER building Building A second floor Cardiac Testing [phone] (For example: Holter Monitor, Cardiac Stress tests,Event Monitor, or 2D Echocardiograms)  [facility] call [phone] usually in [facility] A  Walk in Clinic in [place] at [facility]. [address] Mon-Fri at 8am-7:30 p.m., and Sat/Sun 9:00a. m.-4:30 p.m. Also at [address] [facility]  Call [phone] for info     Please call our office about any questions regarding your treatment/medicines/tests as a result of today's visit. For your safety, read the entire package insert of all medicines prescribed to you and be aware of all of the risks of treatment even beyond those discussed today. All therapies have potential risk of harm or side effects or medication interactions.   It is your duty and for your safety to discuss with the pharmacist and our office with questions, and to notify us and stop treatment if problems arise, but know that our intention is that the benefits outweigh those potential risks and we strive to make you healthier and to improve your quality of life. Referrals, and Radiology Information:    If your insurance requires a referral to a specialist, please allow 5 business days to process your referral request.    If Cayla Forde MD orders a CT or MRI, it may take up to 10 business days to receive approval from your insurance company. Once our office has called informing you that the insurance company approved your testing, please call Central Scheduling at [phone]  Please allow our office 5 business days to contact you regarding any testing results. Refill policies:   Allow 3 business days for refills; controlled substances may take longer and must be picked up from the office in person. Narcotic medications can only be filled in 30 day increments and must be refilled at an office visit only. If your prescription is due for a refill, you may be due for a follow-up appointment. We cannot refill your maintenance medications at a preventative wellness visit. To best provide you care, patients receiving maintenance medications need to be seen at least twice a year.

## 2023-06-01 NOTE — TELEPHONE ENCOUNTER
Patient scheduled with Vernell Castro for Right hand middle finger possible cyst. No recent imaging in epic.     Future Appointments   Date Time Provider Yefri Merary   6/22/2023  9:40 AM ALINA Burnett [facility]

## 2023-06-05 NOTE — TELEPHONE ENCOUNTER
Patient aware and will get xrays done on her own time before her appt.      Future Appointments   Date Time Provider Yefri Reagan   6/22/2023  9:40 AM ALINA Alamo [facility]

## 2023-06-08 ENCOUNTER — TELEPHONE (OUTPATIENT)
Dept: ORTHOPEDICS CLINIC | Facility: CLINIC | Age: 73
End: 2023-06-08

## 2023-06-08 DIAGNOSIS — M79.645 PAIN OF LEFT MIDDLE FINGER: ICD-10-CM

## 2023-06-08 DIAGNOSIS — M79.642 LEFT HAND PAIN: Primary | ICD-10-CM

## 2023-06-08 DIAGNOSIS — M79.644 PAIN OF RIGHT MIDDLE FINGER: ICD-10-CM

## 2023-06-08 NOTE — TELEPHONE ENCOUNTER
Done. Thank you.       Future Appointments   Date Time Provider Yefri Merary   6/15/2023  1:45 PM PF [facility] PF OUTPT LAB [place]   6/15/2023  2:00 PM PF [facility] RM1 PF MAMMO [place]   6/22/2023  9:25 AM NAP XR RM1 NAP XRAY [facility] [place]   6/22/2023  9:40 AM ALINA Vizcarra [facility] OR[facility]

## 2023-06-08 NOTE — TELEPHONE ENCOUNTER
Patient called regarding xr order for rt hand, patient states xr order is incorrect she is being seen for Lt hand not Rt. Please be advised.

## 2023-06-15 ENCOUNTER — HOSPITAL ENCOUNTER (OUTPATIENT)
Dept: MAMMOGRAPHY | Age: 73
Discharge: HOME | End: 2023-06-15
Attending: FAMILY MEDICINE
Payer: MEDICARE

## 2023-06-15 ENCOUNTER — HOSPITAL ENCOUNTER (OUTPATIENT)
Dept: GENERAL RADIOLOGY | Age: 73
Discharge: HOME | End: 2023-06-15
Attending: PHYSICIAN ASSISTANT
Payer: MEDICARE

## 2023-06-15 ENCOUNTER — LAB ENCOUNTER (OUTPATIENT)
Dept: LAB | Age: 73
End: 2023-06-15
Attending: FAMILY MEDICINE
Payer: MEDICARE

## 2023-06-15 DIAGNOSIS — Z12.31 ENCOUNTER FOR SCREENING MAMMOGRAM FOR MALIGNANT NEOPLASM OF BREAST: ICD-10-CM

## 2023-06-15 DIAGNOSIS — M79.642 LEFT HAND PAIN: ICD-10-CM

## 2023-06-15 DIAGNOSIS — Z13.228 SCREENING FOR ENDOCRINE, METABOLIC AND IMMUNITY DISORDER: ICD-10-CM

## 2023-06-15 DIAGNOSIS — Z13.0 SCREENING FOR ENDOCRINE, METABOLIC AND IMMUNITY DISORDER: ICD-10-CM

## 2023-06-15 DIAGNOSIS — M81.0 SENILE OSTEOPOROSIS: Primary | ICD-10-CM

## 2023-06-15 DIAGNOSIS — Z13.29 SCREENING FOR ENDOCRINE, METABOLIC AND IMMUNITY DISORDER: ICD-10-CM

## 2023-06-15 LAB
ALBUMIN SERPL-MCNC: 3.6 G/DL (ref 3.4–5)
ALBUMIN/GLOB SERPL: 1.1 {RATIO} (ref 1–2)
ALP LIVER SERPL-CCNC: 94 U/L
ALT SERPL-CCNC: 31 U/L
ANION GAP SERPL CALC-SCNC: 6 MMOL/L (ref 0–18)
AST SERPL-CCNC: 26 U/L (ref 15–37)
BASOPHILS # BLD AUTO: 0.04 X10(3) UL (ref 0–0.2)
BASOPHILS NFR BLD AUTO: 0.6 %
BILIRUB SERPL-MCNC: 0.5 MG/DL (ref 0.1–2)
BUN BLD-MCNC: 19 MG/DL (ref 7–18)
CALCIUM BLD-MCNC: 9.9 MG/DL (ref 8.5–10.1)
CHLORIDE SERPL-SCNC: 111 MMOL/L (ref 98–112)
CHOLEST SERPL-MCNC: 260 MG/DL (ref ?–200)
CO2 SERPL-SCNC: 25 MMOL/L (ref 21–32)
CREAT BLD-MCNC: 1.01 MG/DL
EOSINOPHIL # BLD AUTO: 0.27 X10(3) UL (ref 0–0.7)
EOSINOPHIL NFR BLD AUTO: 4.2 %
ERYTHROCYTE [DISTWIDTH] IN BLOOD BY AUTOMATED COUNT: 12.9 %
FASTING PATIENT LIPID ANSWER: YES
FASTING STATUS PATIENT QL REPORTED: YES
GFR SERPLBLD BASED ON 1.73 SQ M-ARVRAT: 60 ML/MIN/1.73M2 (ref 60–?)
GLOBULIN PLAS-MCNC: 3.2 G/DL (ref 2.8–4.4)
GLUCOSE BLD-MCNC: 91 MG/DL (ref 70–99)
HCT VFR BLD AUTO: 38.5 %
HDLC SERPL-MCNC: 69 MG/DL (ref 40–59)
HGB BLD-MCNC: 12.3 G/DL
IMM GRANULOCYTES # BLD AUTO: 0.02 X10(3) UL (ref 0–1)
IMM GRANULOCYTES NFR BLD: 0.3 %
LDLC SERPL CALC-MCNC: 177 MG/DL (ref ?–100)
LYMPHOCYTES # BLD AUTO: 1.71 X10(3) UL (ref 1–4)
LYMPHOCYTES NFR BLD AUTO: 26.4 %
MCH RBC QN AUTO: 29.3 PG (ref 26–34)
MCHC RBC AUTO-ENTMCNC: 31.9 G/DL (ref 31–37)
MCV RBC AUTO: 91.7 FL
MONOCYTES # BLD AUTO: 0.46 X10(3) UL (ref 0.1–1)
MONOCYTES NFR BLD AUTO: 7.1 %
NEUTROPHILS # BLD AUTO: 3.97 X10 (3) UL (ref 1.5–7.7)
NEUTROPHILS # BLD AUTO: 3.97 X10(3) UL (ref 1.5–7.7)
NEUTROPHILS NFR BLD AUTO: 61.4 %
NONHDLC SERPL-MCNC: 191 MG/DL (ref ?–130)
OSMOLALITY SERPL CALC.SUM OF ELEC: 296 MOSM/KG (ref 275–295)
PLATELET # BLD AUTO: 297 10(3)UL (ref 150–450)
POTASSIUM SERPL-SCNC: 4 MMOL/L (ref 3.5–5.1)
PROT SERPL-MCNC: 6.8 G/DL (ref 6.4–8.2)
RBC # BLD AUTO: 4.2 X10(6)UL
SODIUM SERPL-SCNC: 142 MMOL/L (ref 136–145)
T4 FREE SERPL-MCNC: 1 NG/DL (ref 0.8–1.7)
TRIGL SERPL-MCNC: 84 MG/DL (ref 30–149)
TSI SER-ACNC: 1.29 MIU/ML (ref 0.36–3.74)
VIT D+METAB SERPL-MCNC: 75.3 NG/ML (ref 30–100)
VLDLC SERPL CALC-MCNC: 17 MG/DL (ref 0–30)
WBC # BLD AUTO: 6.5 X10(3) UL (ref 4–11)

## 2023-06-15 PROCEDURE — 77063 BREAST TOMOSYNTHESIS BI: CPT | Performed by: FAMILY MEDICINE

## 2023-06-15 PROCEDURE — 84443 ASSAY THYROID STIM HORMONE: CPT

## 2023-06-15 PROCEDURE — 80053 COMPREHEN METABOLIC PANEL: CPT

## 2023-06-15 PROCEDURE — 82306 VITAMIN D 25 HYDROXY: CPT

## 2023-06-15 PROCEDURE — 80061 LIPID PANEL: CPT

## 2023-06-15 PROCEDURE — 85025 COMPLETE CBC W/AUTO DIFF WBC: CPT

## 2023-06-15 PROCEDURE — 77067 SCR MAMMO BI INCL CAD: CPT | Performed by: FAMILY MEDICINE

## 2023-06-15 PROCEDURE — 36415 COLL VENOUS BLD VENIPUNCTURE: CPT

## 2023-06-15 PROCEDURE — 84439 ASSAY OF FREE THYROXINE: CPT

## 2023-06-15 PROCEDURE — 73140 X-RAY EXAM OF FINGER(S): CPT | Performed by: PHYSICIAN ASSISTANT

## 2023-06-22 ENCOUNTER — TELEPHONE (OUTPATIENT)
Dept: ORTHOPEDICS CLINIC | Facility: CLINIC | Age: 73
End: 2023-06-22

## 2023-06-22 ENCOUNTER — OFFICE VISIT (OUTPATIENT)
Dept: ORTHOPEDICS CLINIC | Facility: CLINIC | Age: 73
End: 2023-06-22
Payer: MEDICARE

## 2023-06-22 VITALS — WEIGHT: 148 LBS | HEIGHT: 63 IN | BODY MASS INDEX: 26.22 KG/M2

## 2023-06-22 DIAGNOSIS — M67.449 MUCOUS CYST OF FINGER: Primary | ICD-10-CM

## 2023-06-22 PROCEDURE — 99213 OFFICE O/P EST LOW 20 MIN: CPT | Performed by: PHYSICIAN ASSISTANT

## 2023-06-22 PROCEDURE — 1125F AMNT PAIN NOTED PAIN PRSNT: CPT | Performed by: PHYSICIAN ASSISTANT

## 2023-06-22 NOTE — PROGRESS NOTES
SURGICAL BOOKING SHEET   Name: Mervat Mcdaniel  MRN: [id number]   : 5/3/1950    Surgical Date:   23   Surgical Consent:   Excision of left index finger mucous cyst and bone spur   Diagnosis:    (M67.449) Mucous cyst of finger  (primary encounter diagnosis)    Procedure Codes:   Excision of DIP joint osteophyte (62522) or tendon sheath cyst (58289)   Disposition:   Outpatient   Operative Time:   15 mins   Antibiotics:   Ancef 2g   Anesthesia Type:   Local, PIV Insertion, LR 20mL/hr   Clearance:    NONE   Equipment:   Mucous Cyst: [place] Blade, Mini-C-arm, Local Off Field (0.5% marcaine + 1% lidocaine w/o Epi 1:1 mix), Size 6 Glove, 4-0 nylon, Bacitracin, Adaptic, 1 inch korin, 1 inch Coban   Positioning:   Supine with arm table on transport cart   Assistant:   Assistant: Trey Sanders PA-C   Follow Up:   10-12 days with Eh Robins   Pain Medication:   Norco   Therapy:   None

## 2023-06-26 ENCOUNTER — HOSPITAL ENCOUNTER (OUTPATIENT)
Dept: MAMMOGRAPHY | Age: 73
Discharge: HOME | End: 2023-06-26
Attending: FAMILY MEDICINE
Payer: MEDICARE

## 2023-06-26 ENCOUNTER — HOSPITAL ENCOUNTER (OUTPATIENT)
Dept: ULTRASOUND IMAGING | Age: 73
Discharge: HOME | End: 2023-06-26
Attending: FAMILY MEDICINE
Payer: MEDICARE

## 2023-06-26 DIAGNOSIS — R92.2 INCONCLUSIVE MAMMOGRAM: ICD-10-CM

## 2023-06-26 PROCEDURE — 77061 BREAST TOMOSYNTHESIS UNI: CPT | Performed by: FAMILY MEDICINE

## 2023-06-26 PROCEDURE — 76642 ULTRASOUND BREAST LIMITED: CPT | Performed by: FAMILY MEDICINE

## 2023-06-26 PROCEDURE — 77065 DX MAMMO INCL CAD UNI: CPT | Performed by: FAMILY MEDICINE

## 2023-07-05 ENCOUNTER — OFFICE VISIT (OUTPATIENT)
Dept: FAMILY MEDICINE CLINIC | Facility: CLINIC | Age: 73
End: 2023-07-05
Payer: MEDICARE

## 2023-07-05 VITALS
BODY MASS INDEX: 26.49 KG/M2 | HEART RATE: 81 BPM | WEIGHT: 149.5 LBS | HEIGHT: 63 IN | OXYGEN SATURATION: 96 % | SYSTOLIC BLOOD PRESSURE: 138 MMHG | DIASTOLIC BLOOD PRESSURE: 82 MMHG | RESPIRATION RATE: 16 BRPM | TEMPERATURE: 98 F

## 2023-07-05 DIAGNOSIS — N30.01 ACUTE CYSTITIS WITH HEMATURIA: Primary | ICD-10-CM

## 2023-07-05 LAB
APPEARANCE: CLEAR
BILIRUBIN: NEGATIVE
GLUCOSE (URINE DIPSTICK): NEGATIVE MG/DL
KETONES (URINE DIPSTICK): NEGATIVE MG/DL
NITRITE, URINE: NEGATIVE
PH, URINE: 5.5 (ref 4.5–8)
PROTEIN (URINE DIPSTICK): NEGATIVE MG/DL
SPECIFIC GRAVITY: 1 (ref 1–1.03)
URINE-COLOR: YELLOW
UROBILINOGEN,SEMI-QN: 0.2 MG/DL (ref 0–1.9)

## 2023-07-05 PROCEDURE — 99213 OFFICE O/P EST LOW 20 MIN: CPT | Performed by: FAMILY MEDICINE

## 2023-07-05 PROCEDURE — 87088 URINE BACTERIA CULTURE: CPT | Performed by: FAMILY MEDICINE

## 2023-07-05 PROCEDURE — 87086 URINE CULTURE/COLONY COUNT: CPT | Performed by: FAMILY MEDICINE

## 2023-07-05 PROCEDURE — 81003 URINALYSIS AUTO W/O SCOPE: CPT | Performed by: FAMILY MEDICINE

## 2023-07-05 PROCEDURE — 87186 SC STD MICRODIL/AGAR DIL: CPT | Performed by: FAMILY MEDICINE

## 2023-07-05 RX ORDER — SULFAMETHOXAZOLE AND TRIMETHOPRIM 800; 160 MG/1; MG/1
1 TABLET ORAL 2 TIMES DAILY
Qty: 20 TABLET | Refills: 3 | Status: SHIPPED | OUTPATIENT
Start: 2023-07-05 | End: 2023-07-15

## 2023-07-05 NOTE — PROGRESS NOTES
Subjective:   Patient ID: Marline Paredes is a 72year old female. HPI  Ms. Marlon Reilly is a very pleasant 81-year-old female with known history of lumbar arthritis, osteoporosis, GERD, history of UTI presenting today for burning when she urinates associated with urinary frequency. She was in her [address] when she started to have symptoms there. She went to the [facility] and was given Augmentin but did not feel better from this. In the past she had taken Bactrim which would help. She also had chills but did not report fever. I had reviewed past medical and family histories together with allergy and medication lists documented. History/Other:   Review of Systems   Constitutional:  Negative for fatigue and fever. Respiratory:  Negative for cough and shortness of breath. Cardiovascular:  Negative for chest pain. Current Outpatient Medications   Medication Sig Dispense Refill    sulfamethoxazole-trimethoprim -160 MG Oral Tab per tablet Take 1 tablet by mouth 2 (two) times daily for 10 days. 20 tablet 3    traZODone 100 MG Oral Tab Take 1 tablet (100 mg total) by mouth nightly. 90 tablet 3    rOPINIRole 0.25 MG Oral Tab Take 4 tablets (1 mg total) by mouth 4 (four) times daily. 360 tablet 1    omeprazole 20 MG Oral Capsule Delayed Release Take one capsule (20 mg total) by mouth once daily, 30 minutes prior to breakfast. 90 capsule 3    MELOXICAM 15 MG Oral Tab TAKE 1 TABLET BY MOUTH EVERY DAY 90 tablet 0    Denosumab 60 MG/ML Subcutaneous Solution Prefilled Syringe Inject 1 mL (60 mg total) into the skin every 6 (six) months. Historical documentation - see Epic Immunization Activity for administration details 1 mL 0    Cholecalciferol (VITAMIN D) 2000 UNITS Oral Cap Take 1 capsule (2,000 Units total) by mouth daily. Multiple Vitamin (MULTI-VITAMIN DAILY OR) Take 1 tablet by mouth daily. Ascorbic Acid (VITAMIN C OR) Take 1 tablet by mouth daily.         Calcium Carbonate (CALCIUM 600 OR) Take 1 tablet by mouth daily. Allergies:  Dust Mites              ITCHING    Objective:   Physical Exam  Vitals reviewed. Constitutional:       General: She is not in acute distress. HENT:      Mouth/Throat:      Mouth: Mucous membranes are moist.      Pharynx: Oropharynx is clear. Eyes:      General: No scleral icterus. Conjunctiva/sclera: Conjunctivae normal.   Cardiovascular:      Rate and Rhythm: Normal rate and regular rhythm. Heart sounds: Normal heart sounds. No murmur heard. Pulmonary:      Effort: Pulmonary effort is normal. No respiratory distress. Breath sounds: Normal breath sounds. No wheezing or rales. Abdominal:      General: Bowel sounds are normal. There is no distension. Palpations: Abdomen is soft. Tenderness: There is no abdominal tenderness. Musculoskeletal:      Cervical back: Neck supple. Lymphadenopathy:      Cervical: No cervical adenopathy. Neurological:      Mental Status: She is alert. Psychiatric:         Mood and Affect: Mood normal.         Behavior: Behavior normal.         Assessment & Plan:   Acute cystitis with hematuria  (primary encounter diagnosis)  -UA dip done in the office which shows small amount of blood and moderate amount of leukocytes  - We will submit for urine culture  - Keep hydrated  - May take Tylenol or ibuprofen as needed for fever or pain  - We will going to treat with Bactrim DS  - Call or come in sooner if symptoms worsen or persist      This note was prepared using Segetis voice recognition dictation software. As a result errors may occur. When identified these errors have been corrected.  While every attempt is made to correct errors during dictation discrepancies may still exist          Orders Placed This Encounter      URINALYSIS, AUTO, W/O SCOPE      Urine Culture, Routine      Meds This Visit:  Requested Prescriptions     Signed Prescriptions Disp Refills    sulfamethoxazole-trimethoprim DS 800-160 MG Oral Tab per tablet 20 tablet 3     Sig: Take 1 tablet by mouth 2 (two) times daily for 10 days.        Imaging & Referrals:  None

## 2023-07-05 NOTE — PATIENT INSTRUCTIONS
Thank you for choosing Marilyn Franco MD at [place] [address]  To Do: Gigi Dewitt  1. Please take meds as directed. [facility] is located in [address]. Monday, Tuesday & Friday - 8 a.m. to 4 p.m. Wednesday, Thursday - 7 a.m. to 3 p.m. The lab is closed daily from 12 p.m.-12:30 p.m. Saturday lab hours by appointment. Call [phone] to schedule the appointment. Please signup for International Isotopes, which is electronic access to your record if you have not done so. All your results will post on there. [email or web address]   You can NOW use International Isotopes to book your appointments with us, or consider using open access scheduling which is through the edward website [email or web address] and type in Marilyn Franco MD and follow the links for \"Schedule Online Now\"    To schedule Imaging or tests at [facility] Scheduling [phone], Go to [facility] A ER Building (For example: CT scans, X rays, Ultrasound, MRI)  Cardiac Testing in ER building Building A second floor Cardiac Testing [phone] (For example: Holter Monitor, Cardiac Stress tests,Event Monitor, or 2D Echocardiograms)  [facility] call [phone] usually in [facility] A  Walk in Clinic in [place] at [facility]. [address] Mon-Fri at 8am-7:30 p.m., and Sat/Sun 9:00a. m.-4:30 p.m. Also at [address]  Call [phone] for info     Please call our office about any questions regarding your treatment/medicines/tests as a result of today's visit. For your safety, read the entire package insert of all medicines prescribed to you and be aware of all of the risks of treatment even beyond those discussed today. All therapies have potential risk of harm or side effects or medication interactions.   It is your duty and for your safety to discuss with the pharmacist and our office with questions, and to notify us and stop treatment if problems arise, but know that our intention is that the benefits outweigh those potential risks and we strive to make you healthier and to improve your quality of life. Referrals, and Radiology Information:    If your insurance requires a referral to a specialist, please allow 5 business days to process your referral request.    If Claudia Mckeon MD orders a CT or MRI, it may take up to 10 business days to receive approval from your insurance company. Once our office has called informing you that the insurance company approved your testing, please call Central Scheduling at [phone]  Please allow our office 5 business days to contact you regarding any testing results. Refill policies:   Allow 3 business days for refills; controlled substances may take longer and must be picked up from the office in person. Narcotic medications can only be filled in 30 day increments and must be refilled at an office visit only. If your prescription is due for a refill, you may be due for a follow-up appointment. We cannot refill your maintenance medications at a preventative wellness visit. To best provide you care, patients receiving maintenance medications need to be seen at least twice a year.

## 2023-07-13 ENCOUNTER — TELEPHONE (OUTPATIENT)
Dept: FAMILY MEDICINE CLINIC | Facility: CLINIC | Age: 73
End: 2023-07-13

## 2023-08-03 ENCOUNTER — OFFICE VISIT (OUTPATIENT)
Dept: FAMILY MEDICINE CLINIC | Facility: CLINIC | Age: 73
End: 2023-08-03
Payer: MEDICARE

## 2023-08-03 VITALS
DIASTOLIC BLOOD PRESSURE: 70 MMHG | HEIGHT: 63 IN | HEART RATE: 84 BPM | OXYGEN SATURATION: 98 % | SYSTOLIC BLOOD PRESSURE: 124 MMHG | TEMPERATURE: 98 F | RESPIRATION RATE: 16 BRPM | WEIGHT: 151 LBS | BODY MASS INDEX: 26.75 KG/M2

## 2023-08-03 DIAGNOSIS — G47.00 INSOMNIA, UNSPECIFIED TYPE: ICD-10-CM

## 2023-08-03 DIAGNOSIS — Z00.00 ENCOUNTER FOR ANNUAL WELLNESS EXAM IN MEDICARE PATIENT: Primary | ICD-10-CM

## 2023-08-03 DIAGNOSIS — Z00.00 ENCOUNTER FOR ANNUAL HEALTH EXAMINATION: ICD-10-CM

## 2023-08-03 PROCEDURE — G0439 PPPS, SUBSEQ VISIT: HCPCS | Performed by: FAMILY MEDICINE

## 2023-08-03 PROCEDURE — 1126F AMNT PAIN NOTED NONE PRSNT: CPT | Performed by: FAMILY MEDICINE

## 2023-08-03 PROCEDURE — 99213 OFFICE O/P EST LOW 20 MIN: CPT | Performed by: FAMILY MEDICINE

## 2023-08-03 RX ORDER — ZOLPIDEM TARTRATE 5 MG/1
5 TABLET ORAL NIGHTLY PRN
Qty: 30 TABLET | Refills: 0 | Status: SHIPPED | OUTPATIENT
Start: 2023-08-03

## 2023-09-28 RX ORDER — ROPINIROLE 0.25 MG/1
1 TABLET, FILM COATED ORAL 4 TIMES DAILY
Qty: 360 TABLET | Refills: 1 | Status: SHIPPED | OUTPATIENT
Start: 2023-09-28

## 2023-10-05 ENCOUNTER — OFFICE VISIT (OUTPATIENT)
Dept: FAMILY MEDICINE CLINIC | Facility: CLINIC | Age: 73
End: 2023-10-05
Payer: MEDICARE

## 2023-10-05 ENCOUNTER — HOSPITAL ENCOUNTER (OUTPATIENT)
Dept: GENERAL RADIOLOGY | Age: 73
Discharge: HOME | End: 2023-10-05
Attending: STUDENT IN AN ORGANIZED HEALTH CARE EDUCATION/TRAINING PROGRAM
Payer: MEDICARE

## 2023-10-05 VITALS
WEIGHT: 155.25 LBS | DIASTOLIC BLOOD PRESSURE: 68 MMHG | OXYGEN SATURATION: 95 % | TEMPERATURE: 97 F | RESPIRATION RATE: 20 BRPM | HEART RATE: 63 BPM | HEIGHT: 63 IN | SYSTOLIC BLOOD PRESSURE: 124 MMHG | BODY MASS INDEX: 27.51 KG/M2

## 2023-10-05 DIAGNOSIS — R05.2 SUBACUTE COUGH: Primary | ICD-10-CM

## 2023-10-05 DIAGNOSIS — R05.2 SUBACUTE COUGH: ICD-10-CM

## 2023-10-05 PROCEDURE — 71046 X-RAY EXAM CHEST 2 VIEWS: CPT | Performed by: STUDENT IN AN ORGANIZED HEALTH CARE EDUCATION/TRAINING PROGRAM

## 2023-10-05 PROCEDURE — 99213 OFFICE O/P EST LOW 20 MIN: CPT | Performed by: STUDENT IN AN ORGANIZED HEALTH CARE EDUCATION/TRAINING PROGRAM

## 2023-10-05 RX ORDER — AMOXICILLIN 500 MG/1
CAPSULE ORAL
COMMUNITY
Start: 2023-10-04

## 2023-10-05 RX ORDER — BENZONATATE 100 MG/1
100 CAPSULE ORAL 3 TIMES DAILY PRN
Qty: 30 CAPSULE | Refills: 0 | Status: SHIPPED | OUTPATIENT
Start: 2023-10-05 | End: 2023-10-15

## 2023-10-05 RX ORDER — ALBUTEROL SULFATE 90 UG/1
2 AEROSOL, METERED RESPIRATORY (INHALATION) EVERY 4 HOURS PRN
Qty: 6.7 G | Refills: 0 | Status: SHIPPED | OUTPATIENT
Start: 2023-10-05

## 2023-11-02 ENCOUNTER — OFFICE VISIT (OUTPATIENT)
Dept: FAMILY MEDICINE CLINIC | Facility: CLINIC | Age: 73
End: 2023-11-02
Payer: MEDICARE

## 2023-11-02 VITALS
TEMPERATURE: 98 F | HEIGHT: 63 IN | OXYGEN SATURATION: 98 % | BODY MASS INDEX: 28 KG/M2 | HEART RATE: 62 BPM | WEIGHT: 158 LBS | RESPIRATION RATE: 16 BRPM | DIASTOLIC BLOOD PRESSURE: 70 MMHG | SYSTOLIC BLOOD PRESSURE: 122 MMHG

## 2023-11-02 DIAGNOSIS — B37.31 YEAST VAGINITIS: Primary | ICD-10-CM

## 2023-11-02 PROCEDURE — 99213 OFFICE O/P EST LOW 20 MIN: CPT | Performed by: FAMILY MEDICINE

## 2023-11-02 RX ORDER — FLUCONAZOLE 150 MG/1
150 TABLET ORAL ONCE
Qty: 1 TABLET | Refills: 3 | Status: SHIPPED | OUTPATIENT
Start: 2023-11-02 | End: 2023-11-02

## 2023-11-02 NOTE — PROGRESS NOTES
Subjective:   Patient ID: Mendez Acosta is a 72year old female. HPI  Ms. Kristian Jones is a very pleasant 68-year-old female with known history of lumbar arthritis, osteoporosis, GERD, history of UTI here today for vaginal itching for the past several days despite taking over-the-counter Monistat. She was previously on antibiotic for tooth infection. It has been years since she has had yeast infection. No fever no cough no chest pain or shortness of breath no nausea no vomiting no abdominal pain. No vaginal bleeding nor changes in bladder habits. I had reviewed past medical and family histories together with allergy and medication lists documented. History/Other:   Review of Systems   Constitutional:  Negative for fatigue and fever. Respiratory:  Negative for cough and shortness of breath. Cardiovascular:  Negative for chest pain. Gastrointestinal:  Negative for abdominal pain, diarrhea, nausea and vomiting. Current Outpatient Medications   Medication Sig Dispense Refill    amoxicillin 500 MG Oral Cap       albuterol 108 (90 Base) MCG/ACT Inhalation Aero Soln Inhale 2 puffs into the lungs every 4 (four) hours as needed for Wheezing or Shortness of Breath. 6.7 g 0    rOPINIRole 0.25 MG Oral Tab Take 4 tablets (1 mg total) by mouth 4 (four) times daily. 360 tablet 1    traZODone 100 MG Oral Tab Take 1 tablet (100 mg total) by mouth nightly. 90 tablet 3    omeprazole 20 MG Oral Capsule Delayed Release Take one capsule (20 mg total) by mouth once daily, 30 minutes prior to breakfast. 90 capsule 3    MELOXICAM 15 MG Oral Tab TAKE 1 TABLET BY MOUTH EVERY DAY 90 tablet 0    Cholecalciferol (VITAMIN D) 2000 UNITS Oral Cap Take 1 capsule (2,000 Units total) by mouth daily. Multiple Vitamin (MULTI-VITAMIN DAILY OR) Take 1 tablet by mouth daily. Ascorbic Acid (VITAMIN C OR) Take 1 tablet by mouth daily. Calcium Carbonate (CALCIUM 600 OR) Take 1 tablet by mouth daily. Allergies:  Dust Mites              ITCHING    Objective:   Physical Exam  Constitutional:       General: She is not in acute distress. HENT:      Mouth/Throat:      Mouth: Mucous membranes are moist.      Pharynx: Oropharynx is clear. Eyes:      General: No scleral icterus. Conjunctiva/sclera: Conjunctivae normal.   Cardiovascular:      Rate and Rhythm: Normal rate and regular rhythm. Heart sounds: Normal heart sounds. No murmur heard. Pulmonary:      Effort: Pulmonary effort is normal. No respiratory distress. Breath sounds: Normal breath sounds. No wheezing or rales. Abdominal:      General: Bowel sounds are normal.      Palpations: Abdomen is soft. Genitourinary:     Comments: deferred  Musculoskeletal:      Right lower leg: No edema. Left lower leg: No edema. Skin:     General: Skin is warm. Neurological:      Mental Status: She is alert. Psychiatric:         Mood and Affect: Mood normal.         Behavior: Behavior normal.         Assessment & Plan:   No diagnosis found.  -Will Goeden treat with Diflucan 150 mg once but had refills with this. - Monitor for now but certainly come back if symptoms worsen or persist.      I had reviewed past medical and family histories together with allergy and medication lists documented. No orders of the defined types were placed in this encounter.       Meds This Visit:  Requested Prescriptions      No prescriptions requested or ordered in this encounter       Imaging & Referrals:  None

## 2023-11-02 NOTE — PATIENT INSTRUCTIONS
Thank you for choosing Cielo Hathaway MD at [place] [address]  To Do: Raghuhimanshu BELLE Renate  1. Please take meds as directed. [facility] is located in [address]. Monday, Tuesday & Friday - 8 a.m. to 4 p.m. Wednesday, Thursday - 7 a.m. to 3 p.m. The lab is closed daily from 12 p.m.-12:30 p.m. Saturday lab hours by appointment. Call [phone] to schedule the appointment. Please signup for Accella Learning, which is electronic access to your record if you have not done so. All your results will post on there. [email or web address]   You can NOW use Accella Learning to book your appointments with us, or consider using open access scheduling which is through the edward website [email or web address] and type in Cielo Hathaway MD and follow the links for \"Schedule Online Now\"    To schedule Imaging or tests at [facility] Scheduling [phone], Go to [facility] A ER Building (For example: CT scans, X rays, Ultrasound, MRI)  Cardiac Testing in ER building Building A second floor Cardiac Testing [phone] (For example: Holter Monitor, Cardiac Stress tests,Event Monitor, or 2D Echocardiograms)  [facility] call [phone] usually in [facility] A  Walk in Clinic in [place] at [facility]. [address] Mon-Fri at 8am-7:30 p.m., and Sat/Sun 9:00a. m.-4:30 p.m. Also at [address]  Call [phone] for info     Please call our office about any questions regarding your treatment/medicines/tests as a result of today's visit. For your safety, read the entire package insert of all medicines prescribed to you and be aware of all of the risks of treatment even beyond those discussed today. All therapies have potential risk of harm or side effects or medication interactions.   It is your duty and for your safety to discuss with the pharmacist and our office with questions, and to notify us and stop treatment if problems arise, but know that our intention is that the benefits outweigh those potential risks and we strive to make you healthier and to improve your quality of life. Referrals, and Radiology Information:    If your insurance requires a referral to a specialist, please allow 5 business days to process your referral request.    If Avni Carreno MD orders a CT or MRI, it may take up to 10 business days to receive approval from your insurance company. Once our office has called informing you that the insurance company approved your testing, please call Central Scheduling at [phone]  Please allow our office 5 business days to contact you regarding any testing results. Refill policies:   Allow 3 business days for refills; controlled substances may take longer and must be picked up from the office in person. Narcotic medications can only be filled in 30 day increments and must be refilled at an office visit only. If your prescription is due for a refill, you may be due for a follow-up appointment. We cannot refill your maintenance medications at a preventative wellness visit. To best provide you care, patients receiving maintenance medications need to be seen at least twice a year.

## 2023-12-08 NOTE — TELEPHONE ENCOUNTER
Requesting Ropinirole 0.25mg  LOV: 11/2/23  RTC: not noted  Last Relevant Labs: 6/15/23  Filled: 9/28/23 #360 with  refills    No future appointments.     Non-protocol med:  Rx pended and routed for approval/denial

## 2023-12-09 RX ORDER — ROPINIROLE 0.25 MG/1
1 TABLET, FILM COATED ORAL 4 TIMES DAILY
Qty: 360 TABLET | Refills: 1 | Status: SHIPPED | OUTPATIENT
Start: 2023-12-09

## 2024-01-21 RX ORDER — ROPINIROLE 0.25 MG/1
1 TABLET, FILM COATED ORAL 4 TIMES DAILY
Qty: 360 TABLET | Refills: 1 | Status: SHIPPED | OUTPATIENT
Start: 2024-01-21

## 2024-02-06 NOTE — ED PROVIDER NOTES
Patient Seen in: THE [facility] In [place]    History   Patient presents with:  Rash Skin Problem (integumentary)    Stated Complaint: rash since yesterday, was out in yard with weeds, + itching     HPI    Gigi is a 46-year-old female Patient reschedule to see MD on 2/6/24   POLYPECTOMY  3/4/2015: FLUOROSCOPE EXAMINATION Left      Comment: Procedure: BASAL JOINT ARTHROPLASTY USING APL                TENDON TRANSFER RECONSTRUCTION;  Surgeon:                Salina Dawn MD;  Location: [facility]  4/7/2015: FLUOROSCOPE EXA Smoking status: Former Smoker                                                              Packs/day: 1.00      Years: 5.00         Quit date: 2/23/1973  Alcohol use: Yes           0.0 - 1.2 oz/week     Glasses of wine: 0 - 2 per week     Comment: 2 Course  ------------------------------------------------------------  VIKAS   Clinical impression: Contact dermatitis, likely due to poison ivy  Plan: Patient is started on a 14 day taper of prednisone.   She is instructed on topical modalities for relief of

## 2024-02-12 ENCOUNTER — TELEPHONE (OUTPATIENT)
Dept: FAMILY MEDICINE CLINIC | Facility: CLINIC | Age: 74
End: 2024-02-12

## 2024-02-12 DIAGNOSIS — M54.2 NECK PAIN: Primary | ICD-10-CM

## 2024-02-12 NOTE — TELEPHONE ENCOUNTER
Pt would like the name of a specialist who can help her with a pinched nerve in her neck. Does not need referral just recommendation/order?

## 2024-04-25 RX ORDER — ROPINIROLE 0.25 MG/1
1 TABLET, FILM COATED ORAL 4 TIMES DAILY
Qty: 360 TABLET | Refills: 1 | Status: SHIPPED | OUTPATIENT
Start: 2024-04-25

## 2024-04-26 ENCOUNTER — HOSPITAL ENCOUNTER (EMERGENCY)
Age: 74
Discharge: HOME | End: 2024-04-26
Payer: MEDICARE

## 2024-04-26 ENCOUNTER — APPOINTMENT (OUTPATIENT)
Dept: GENERAL RADIOLOGY | Age: 74
End: 2024-04-26
Attending: PHYSICIAN ASSISTANT
Payer: MEDICARE

## 2024-04-26 VITALS
HEART RATE: 67 BPM | WEIGHT: 139 LBS | OXYGEN SATURATION: 100 % | BODY MASS INDEX: 24.63 KG/M2 | SYSTOLIC BLOOD PRESSURE: 149 MMHG | TEMPERATURE: 98 F | HEIGHT: 63 IN | RESPIRATION RATE: 18 BRPM | DIASTOLIC BLOOD PRESSURE: 86 MMHG

## 2024-04-26 DIAGNOSIS — M72.2 PLANTAR FASCIITIS: Primary | ICD-10-CM

## 2024-04-26 PROCEDURE — 99284 EMERGENCY DEPT VISIT MOD MDM: CPT

## 2024-04-26 PROCEDURE — 73630 X-RAY EXAM OF FOOT: CPT | Performed by: PHYSICIAN ASSISTANT

## 2024-04-26 PROCEDURE — 99283 EMERGENCY DEPT VISIT LOW MDM: CPT

## 2024-04-26 RX ORDER — NAPROXEN 500 MG/1
500 TABLET ORAL 2 TIMES DAILY PRN
Qty: 20 TABLET | Refills: 0 | Status: SHIPPED | OUTPATIENT
Start: 2024-04-26 | End: 2024-05-03

## 2024-04-26 NOTE — DISCHARGE INSTRUCTIONS
Epsom salt warm soaks.  Arch support inserts.  Anti-inflammatories written.  Eventual physical therapy and stretches.

## 2024-04-26 NOTE — ED PROVIDER NOTES
Patient Seen in: [facility] In [place]      History     Chief Complaint   Patient presents with    Foot Pain     Stated Complaint: Left foot pain, denies injury, states \"I think I broke it.\"    Subjective:   HPI    Very pleasant 73-year-old female.  Arrives to the emergency department for evaluation of left foot pain.  White severe these last 2 days.  No acute injury.  However, she has been walking more this past week visiting her  in the hospital.  She has a history of 5th metatarsal fracture to the same foot.    Objective:   Past Medical History:    Abdominal distention    Abdominal pain    Arthritis    left basal joint     Back pain    Back problem    Benign colon polyp    Bloating    Chronic kidney disease, stage II (mild)    pt denies    Constipation    De Quervain's tenosynovitis    Diarrhea, unspecified    Eczema    hand    Fatigue    Fever    Flatulence/gas pain/belching    GERD (gastroesophageal reflux disease)    Headache disorder    Heartburn    Hemorrhoids    Hiatal hernia    Hx of motion sickness    Labyrinthitis    Lumbar facet arthropathy    Onychocryptosis    second digit, right foot    Osteoporosis    Sleep disturbance    Spondylolisthesis, lumbar region    Stomach ulcer    Stress    Uncomfortable fullness after meals    Vestibular neuronitis    Visual impairment    Wears glasses              Past Surgical History:   Procedure Laterality Date    Appendectomy  1967    [place]    Cataract extraction Bilateral     Colonoscopy      Colonoscopy & polypectomy  1/13/2011    Egd  12/20/2018    hiatal hernia, gastritis, no Montaño's, cont PPI, Dr. Bear    Fluoroscope examination Left 3/4/2015    Procedure: BASAL JOINT ARTHROPLASTY USING APL TENDON TRANSFER RECONSTRUCTION;  Surgeon: Rex Alonso MD;  Location: [facility]    Fluoroscope examination Left 4/7/2015    Procedure: REMOVAL HARDWARE UPPER EXTREMITY;  Surgeon: Rex Alonso MD;  Location: [facility]     Other surgical history      Removal deep implant Left 2015    Procedure: REMOVAL HARDWARE UPPER EXTREMITY;  Surgeon: Rex Alonso MD;  Location: [facility]    Repair intercarp/carp-metacarp jt Left 3/4/2015    Procedure: BASAL JOINT ARTHROPLASTY USING APL TENDON TRANSFER RECONSTRUCTION;  Surgeon: Rex Alonso MD;  Location: [facility]    Transplant hand tendon Left 3/4/2015    Procedure: BASAL JOINT ARTHROPLASTY USING APL TENDON TRANSFER RECONSTRUCTION;  Surgeon: Rex Alonso MD;  Location: [facility]                Social History     Socioeconomic History    Marital status:    Tobacco Use    Smoking status: Former     Current packs/day: 0.00     Average packs/day: 1 pack/day for 5.0 years (5.0 ttl pk-yrs)     Types: Cigarettes     Start date: 1968     Quit date: 1973     Years since quittin.9    Smokeless tobacco: Never   Vaping Use    Vaping status: Never Used   Substance and Sexual Activity    Alcohol use: Yes     Comment: social    Drug use: No   Other Topics Concern    Caffeine Concern Yes     Comment: 3-4 cups per week    Weight Concern No    Exercise Yes    Seat Belt Yes              Review of Systems    Positive for stated complaint: Left foot pain, denies injury, states \"I think I broke it.\"  Other systems are as noted in HPI.  Constitutional and vital signs reviewed.      All other systems reviewed and negative except as noted above.    Physical Exam     ED Triage Vitals [24 1704]   /86   Pulse 67   Resp 18   Temp 98.2 °F (36.8 °C)   Temp src Temporal   SpO2 100 %   O2 Device None (Room air)       Current:/86   Pulse 67   Temp 98.2 °F (36.8 °C) (Temporal)   Resp 18   Ht 160 cm (5' 3\")   Wt 63 kg   SpO2 100%   Breastfeeding No   BMI 24.62 kg/m²         Physical Exam    Gen: Well appearing, well groomed, alert and aware x 3  Neck: Supple, full range of motion  Eye examination: EOMs are intact, normal conjunctival  ENT: Atraumatic  Lung:  No distress, RR, no retraction  Extremities: Generalized pain to palpation to the left foot plantar fascia and insertion.  Low-grade pain along the tendon pathways of the dorsal foot.  No significant swelling.  Full active range of motion.        ED Course   Labs Reviewed - No data to display        XR FOOT, COMPLETE (MIN 3 VIEWS), LEFT (CPT=73630)    Result Date: 4/26/2024  PROCEDURE:  XR FOOT, COMPLETE (MIN 3 VIEWS), LEFT (CPT=73630)  TECHNIQUE:  AP, oblique, and lateral views were obtained.  COMPARISON:  None.  INDICATIONS:  Left foot pain, denies injury, states I think I broke it.  PATIENT STATED HISTORY: (As transcribed by Technologist)  Left foot pain at 4th/5th metatarsals dorsal and plantar for past 3 days. Sudden onset.    FINDINGS:  No acute fracture or dislocation.  Joint spaces and bony alignment are maintained.  No focal soft tissue abnormality.            CONCLUSION:  No acute osseous findings.   LOCATION:  [id number]   Dictated by (CST): aJmes Sellers MD on 4/26/2024 at 5:23 PM     Finalized by (CST): James Sellers MD on 4/26/2024 at 5:23 PM                 MDM          Generalized pain to palpation to the left foot plantar fascia and insertion.  Low-grade pain along the tendon pathways of the dorsal foot.  No significant swelling.  Full active range of motion.    CONCLUSION:  No acute osseous findings.   LOCATION:  [id number]   Dictated by (CST): James Sellers MD on 4/26/2024 at 5:23 PM     Finalized by (CST): James Sellers MD on 4/26/2024 at 5:23 PM          Patient has a history of plantar fasciitis.  This is where she is most tender.  She has deep arches.  Purchase inserts.  Stretches.  Anti-inflammatories.  Epsom salt warm soaks.  Rest        Epsom salt warm soaks.  Arch support inserts.  Anti-inflammatories written.  Eventual physical therapy and stretches.                        Medical Decision Making      Disposition and Plan     Clinical Impression:  1. Plantar fasciitis          Disposition:  There is no disposition on file for this visit.  There is no disposition time on file for this visit.    Follow-up:  Adelfo Gonzales MD  [address] [place] [id number]  [phone]    Follow up            Medications Prescribed:  Current Discharge Medication List        START taking these medications    Details   naproxen 500 MG Oral Tab Take 1 tablet (500 mg total) by mouth 2 (two) times daily as needed.  Qty: 20 tablet, Refills: 0

## 2024-04-26 NOTE — ED INITIAL ASSESSMENT (HPI)
Pain to left foot started 2 days ago, denies injury to area. Patient states she broke same foot in past and \"feels broken.\"

## 2024-05-01 ENCOUNTER — PATIENT OUTREACH (OUTPATIENT)
Dept: CASE MANAGEMENT | Age: 74
End: 2024-05-01

## 2024-05-14 PROBLEM — F41.9 ANXIETY: Status: ACTIVE | Noted: 2024-05-14

## 2024-05-24 RX ORDER — TRAZODONE HYDROCHLORIDE 100 MG/1
100 TABLET ORAL NIGHTLY
Qty: 90 TABLET | Refills: 3 | Status: SHIPPED | OUTPATIENT
Start: 2024-05-24

## 2024-08-23 ENCOUNTER — OFFICE VISIT (OUTPATIENT)
Dept: FAMILY MEDICINE CLINIC | Facility: CLINIC | Age: 74
End: 2024-08-23
Payer: MEDICARE

## 2024-08-23 VITALS
BODY MASS INDEX: 25.54 KG/M2 | HEIGHT: 63 IN | DIASTOLIC BLOOD PRESSURE: 70 MMHG | WEIGHT: 144.13 LBS | OXYGEN SATURATION: 97 % | SYSTOLIC BLOOD PRESSURE: 122 MMHG | HEART RATE: 77 BPM

## 2024-08-23 DIAGNOSIS — Z12.31 ENCOUNTER FOR SCREENING MAMMOGRAM FOR MALIGNANT NEOPLASM OF BREAST: ICD-10-CM

## 2024-08-23 DIAGNOSIS — F41.9 ANXIETY: ICD-10-CM

## 2024-08-23 DIAGNOSIS — N30.00 ACUTE CYSTITIS WITHOUT HEMATURIA: Primary | ICD-10-CM

## 2024-08-23 LAB
APPEARANCE: CLEAR
BILIRUBIN: NEGATIVE
GLUCOSE (URINE DIPSTICK): NEGATIVE MG/DL
KETONES (URINE DIPSTICK): NEGATIVE MG/DL
LEUKOCYTES: NEGATIVE
MULTISTIX LOT#: ABNORMAL NUMERIC
NITRITE, URINE: NEGATIVE
PH, URINE: 6 (ref 4.5–8)
PROTEIN (URINE DIPSTICK): NEGATIVE MG/DL
SPECIFIC GRAVITY: 1.01 (ref 1–1.03)
URINE-COLOR: YELLOW
UROBILINOGEN,SEMI-QN: 0.2 MG/DL (ref 0–1.9)

## 2024-08-23 PROCEDURE — 99214 OFFICE O/P EST MOD 30 MIN: CPT | Performed by: FAMILY MEDICINE

## 2024-08-23 PROCEDURE — 81003 URINALYSIS AUTO W/O SCOPE: CPT | Performed by: FAMILY MEDICINE

## 2024-08-23 PROCEDURE — 87086 URINE CULTURE/COLONY COUNT: CPT | Performed by: FAMILY MEDICINE

## 2024-08-23 PROCEDURE — G2211 COMPLEX E/M VISIT ADD ON: HCPCS | Performed by: FAMILY MEDICINE

## 2024-08-23 RX ORDER — SULFAMETHOXAZOLE/TRIMETHOPRIM 800-160 MG
1 TABLET ORAL 2 TIMES DAILY
Qty: 20 TABLET | Refills: 0 | Status: SHIPPED | OUTPATIENT
Start: 2024-08-23 | End: 2024-09-02

## 2024-08-23 RX ORDER — CLOBETASOL PROPIONATE 0.5 MG/G
CREAM TOPICAL
COMMUNITY
Start: 2024-07-08

## 2024-08-23 RX ORDER — ALPRAZOLAM 0.25 MG
0.25 TABLET ORAL 3 TIMES DAILY PRN
Qty: 30 TABLET | Refills: 1 | Status: SHIPPED | OUTPATIENT
Start: 2024-08-23

## 2024-08-23 NOTE — PATIENT INSTRUCTIONS
Thank you for choosing Adelfo Gonzales MD at [facility]  To Do: Gigi Dewitt  1. Please take meds as directed.     Call [phone] to schedule the appointment.   Please signup for [employer], which is electronic access to your record if you have not done so.  All your results will post on there.  [email or web address]   You can NOW use Funplus to book your appointments with us, or consider using open access scheduling which is through the [employer] website [email or web address] and type in Adelfo Gonzales MD and follow the links for \"Schedule Online Now\"    To schedule Imaging or tests at [place] call Central Scheduling [phone], Go to [facility] A ER Building (For example: CT scans, X rays, Ultrasound, MRI)  Cardiac Testing in ER building Building A second floor Cardiac Testing [phone] (For example: Holter Monitor, Cardiac Stress tests,Event Monitor, or 2D Echocardiograms)  [facility] call [phone] usually in [facility] A  Walk in Clinic in [place] at [address] Mon-Fri at 8am-7:30 p.m., and Sat/Sun 9:00a.m.-4:30 p.m.  Also at [address] [place] [address]  Call [phone] for info     Please call our office about any questions regarding your treatment/medicines/tests as a result of today's visit.  For your safety, read the entire package insert of all medicines prescribed to you and be aware of all of the risks of treatment even beyond those discussed today.  All therapies have potential risk of harm or side effects or medication interactions.  It is your duty and for your safety to discuss with the pharmacist and our office with questions, and to notify us and stop treatment if problems arise, but know that our intention is that the benefits outweigh those potential risks and we strive to make you healthier and to improve your quality of life.    Referrals, and Radiology Information:    If your insurance requires a referral to a specialist, please allow 5 business  days to process your referral request.    If Adelfo Gonzales MD orders a CT or MRI, it may take up to 10 business days to receive approval from your insurance company. Once our office has called informing you that the insurance company approved your testing, please call Central Scheduling at [phone]  Please allow our office 5 business days to contact you regarding any testing results.    Refill policies:   Allow 3 business days for refills; controlled substances may take longer and must be picked up from the office in person.  Narcotic medications can only be filled in 30 day increments and must be refilled at an office visit only.  If your prescription is due for a refill, you may be due for a follow-up appointment.  We cannot refill your maintenance medications at a preventative wellness visit.  To best provide you care, patients receiving maintenance medications need to be seen at least twice a year.

## 2024-08-23 NOTE — PROGRESS NOTES
Subjective:   Patient ID: Gigi Dewitt is a 74 year old female.    HPI  Ms Dewitt is a very pleasant 74-year-old female with known history of arthritis, restless legs, insomnia, GERD, history of UTI, anxiety here today for burning when she urinates for the past several days.  Seems similar to symptoms that she had related to UTI.  No blood in the urine.  She has been going to the bathroom frequently.  No fever no fatigue no cough no chest pain no shortness of breath no nausea no vomiting no abdominal pain.   recently passed away.  I will prescribe her with alprazolam and has not been taking as much but will need refills as this has been helpful.  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  She also went to [place] to visit her son and grandchildren.  Her grandson lives with her. I had reviewed past medical and family histories together with allergy and medication lists documented.    History/Other:   Review of Systems   Constitutional:  Negative for fatigue and fever.   HENT:  Negative for sore throat and trouble swallowing.    Respiratory:  Negative for cough and shortness of breath.    Cardiovascular:  Negative for chest pain.   Gastrointestinal:  Negative for abdominal pain, constipation, diarrhea, nausea and vomiting.   Genitourinary:  Negative for hematuria.   Neurological:  Negative for dizziness, weakness, light-headedness and headaches.     Current Outpatient Medications   Medication Sig Dispense Refill    clobetasol 0.05 % External Cream APPLY TO AFFECTED AREA EVERY DAY X1 MONTH AS NEEDED FOR FLARES      ALPRAZolam 0.25 MG Oral Tab Take 1 tablet (0.25 mg total) by mouth 3 (three) times daily as needed. 30 tablet 1    sulfamethoxazole-trimethoprim -160 MG Oral Tab per tablet Take 1 tablet by mouth 2 (two) times daily for 10 days. 20 tablet 0    TRAZODONE 100 MG Oral Tab TAKE 1 TABLET BY MOUTH EVERY DAY AT NIGHT 90 tablet 3    omeprazole 20 MG Oral  Capsule Delayed Release Take one capsule (20 mg total) by mouth once daily, 30 minutes prior to breakfast. 90 capsule 3    rOPINIRole 0.25 MG Oral Tab Take 4 tablets (1 mg total) by mouth 4 (four) times daily. 360 tablet 1    MELOXICAM 15 MG Oral Tab TAKE 1 TABLET BY MOUTH EVERY DAY 90 tablet 0    Cholecalciferol (VITAMIN D) 2000 UNITS Oral Cap Take 1 capsule (2,000 Units total) by mouth daily.      Multiple Vitamin (MULTI-VITAMIN DAILY OR) Take 1 tablet by mouth daily.        Ascorbic Acid (VITAMIN C OR) Take 1 tablet by mouth daily.        Calcium Carbonate (CALCIUM 600 OR) Take 1 tablet by mouth daily.         Allergies:  Allergies   Allergen Reactions    Seasonal OTHER (SEE COMMENTS)     Runny nose, eyes    Dust Mites ITCHING       Objective:   Physical Exam  Vitals reviewed.   Constitutional:       General: She is not in acute distress.  HENT:      Mouth/Throat:      Mouth: Mucous membranes are moist.      Pharynx: Oropharynx is clear.   Eyes:      General: No scleral icterus.     Conjunctiva/sclera: Conjunctivae normal.   Cardiovascular:      Rate and Rhythm: Normal rate and regular rhythm.      Heart sounds: Normal heart sounds. No murmur heard.  Pulmonary:      Effort: Pulmonary effort is normal. No respiratory distress.      Breath sounds: No wheezing or rales.   Abdominal:      General: Bowel sounds are normal. There is no distension.      Palpations: Abdomen is soft. There is no mass.      Tenderness: There is no abdominal tenderness.   Musculoskeletal:      Cervical back: Neck supple.      Right lower leg: No edema.      Left lower leg: No edema.   Lymphadenopathy:      Cervical: No cervical adenopathy.   Skin:     General: Skin is warm.   Neurological:      Mental Status: She is alert.   Psychiatric:         Mood and Affect: Mood normal.         Behavior: Behavior normal.         Assessment & Plan:   1. Acute cystitis without hematuria   -Keep hydrated  - May take Tylenol as needed for fever or pain  -  Previous urine culture showed E. coli which is sensitive to Bactrim  - Will prescribe with Bactrim  - Will do urine culture  -UA dip done which shows negative nitrates, trace blood and WBC   2. Encounter for screening mammogram for malignant neoplasm of breast    3. Anxiety   -Continue with alprazolam which was prescribed and sent to her pharmacy     Follow-up after 3 months for her wellness exam.  No orders of the defined types were placed in this encounter.      Meds This Visit:  Requested Prescriptions     Signed Prescriptions Disp Refills    ALPRAZolam 0.25 MG Oral Tab 30 tablet 1     Sig: Take 1 tablet (0.25 mg total) by mouth 3 (three) times daily as needed.    sulfamethoxazole-trimethoprim -160 MG Oral Tab per tablet 20 tablet 0     Sig: Take 1 tablet by mouth 2 (two) times daily for 10 days.       Imaging & Referrals:  [facility] 2D+3D SCREENING BILAT (CPT=77067/82760)

## 2024-09-04 ENCOUNTER — TELEPHONE (OUTPATIENT)
Dept: FAMILY MEDICINE CLINIC | Facility: CLINIC | Age: 74
End: 2024-09-04

## 2024-09-04 DIAGNOSIS — N30.00 ACUTE CYSTITIS WITHOUT HEMATURIA: Primary | ICD-10-CM

## 2024-09-04 NOTE — TELEPHONE ENCOUNTER
Last OV 8/23/24  Urine culture showed no growth    Given Rx for Bactrim DS bid x 10 days    Patient c/o persistent symptoms.   Do you want to see her back in the office?

## 2024-09-04 NOTE — TELEPHONE ENCOUNTER
Patitent finished medication for UTI and still has symptoms.    Please advise.  Declined an appointment.  PH. [phone]

## 2024-09-05 ENCOUNTER — HOSPITAL ENCOUNTER (OUTPATIENT)
Dept: MAMMOGRAPHY | Age: 74
Discharge: HOME | End: 2024-09-05
Attending: FAMILY MEDICINE
Payer: MEDICARE

## 2024-09-05 DIAGNOSIS — Z12.31 ENCOUNTER FOR SCREENING MAMMOGRAM FOR MALIGNANT NEOPLASM OF BREAST: ICD-10-CM

## 2024-09-05 PROCEDURE — 77067 SCR MAMMO BI INCL CAD: CPT | Performed by: FAMILY MEDICINE

## 2024-09-05 PROCEDURE — 77063 BREAST TOMOSYNTHESIS BI: CPT | Performed by: FAMILY MEDICINE

## 2024-10-04 ENCOUNTER — HOSPITAL ENCOUNTER (OUTPATIENT)
Dept: ULTRASOUND IMAGING | Age: 74
Discharge: HOME | End: 2024-10-04
Attending: FAMILY MEDICINE
Payer: MEDICARE

## 2024-10-04 DIAGNOSIS — R92.2 INCONCLUSIVE MAMMOGRAM: ICD-10-CM

## 2024-10-04 PROCEDURE — 76642 ULTRASOUND BREAST LIMITED: CPT | Performed by: FAMILY MEDICINE

## 2024-11-06 ENCOUNTER — OFFICE VISIT (OUTPATIENT)
Dept: FAMILY MEDICINE CLINIC | Facility: CLINIC | Age: 74
End: 2024-11-06
Payer: MEDICARE

## 2024-11-06 VITALS
SYSTOLIC BLOOD PRESSURE: 126 MMHG | HEART RATE: 72 BPM | DIASTOLIC BLOOD PRESSURE: 74 MMHG | RESPIRATION RATE: 16 BRPM | OXYGEN SATURATION: 98 % | BODY MASS INDEX: 26.75 KG/M2 | HEIGHT: 63 IN | TEMPERATURE: 98 F | WEIGHT: 151 LBS

## 2024-11-06 DIAGNOSIS — F41.9 ANXIETY: ICD-10-CM

## 2024-11-06 DIAGNOSIS — Z13.29 SCREENING FOR ENDOCRINE, METABOLIC AND IMMUNITY DISORDER: ICD-10-CM

## 2024-11-06 DIAGNOSIS — Z13.0 SCREENING FOR ENDOCRINE, METABOLIC AND IMMUNITY DISORDER: ICD-10-CM

## 2024-11-06 DIAGNOSIS — Z00.00 ENCOUNTER FOR ANNUAL WELLNESS EXAM IN MEDICARE PATIENT: Primary | ICD-10-CM

## 2024-11-06 DIAGNOSIS — Z13.228 SCREENING FOR ENDOCRINE, METABOLIC AND IMMUNITY DISORDER: ICD-10-CM

## 2024-11-06 DIAGNOSIS — Z00.00 ENCOUNTER FOR ANNUAL HEALTH EXAMINATION: ICD-10-CM

## 2024-11-06 RX ORDER — ALPRAZOLAM 0.25 MG/1
0.25 TABLET ORAL 3 TIMES DAILY PRN
Qty: 30 TABLET | Refills: 1 | Status: SHIPPED | OUTPATIENT
Start: 2024-11-06

## 2024-11-06 NOTE — PATIENT INSTRUCTIONS
Thank you for choosing Adelfo Gonzales MD at [facility]  To Do: Gigi BELLE Renate  1. Please see age appropriate health prevention below     Call [phone] to schedule the appointment.   Please signup for [employer], which is electronic access to your record if you have not done so.  All your results will post on there.  [email or web address]   You can NOW use [employer] to book your appointments with us, or consider using open access scheduling which is through the [place] website [email or web address] and type in Adelfo Gonzales MD and follow the links for \"Schedule Online Now\"    To schedule Imaging or tests at [place] call Central Scheduling [phone], Go to [facility] A ER Building (For example: CT scans, X rays, Ultrasound, MRI)  Cardiac Testing in ER building Building A second floor Cardiac Testing [phone] (For example: Holter Monitor, Cardiac Stress tests,Event Monitor, or 2D Echocardiograms)  [facility] call [phone] usually in [facility] A  Walk in Clinic in [place] at [address] Mon-Fri at 8am-7:30 p.m., and Sat/Sun 9:00a.m.-4:30 p.m.  Also at [address] [place] [address]  Call [phone] for info     Please call our office about any questions regarding your treatment/medicines/tests as a result of today's visit.  For your safety, read the entire package insert of all medicines prescribed to you and be aware of all of the risks of treatment even beyond those discussed today.  All therapies have potential risk of harm or side effects or medication interactions.  It is your duty and for your safety to discuss with the pharmacist and our office with questions, and to notify us and stop treatment if problems arise, but know that our intention is that the benefits outweigh those potential risks and we strive to make you healthier and to improve your quality of life.    Referrals, and Radiology Information:    If your insurance requires a referral to a specialist,  please allow 5 business days to process your referral request.    If Adelfo Gonzales MD orders a CT or MRI, it may take up to 10 business days to receive approval from your insurance company. Once our office has called informing you that the insurance company approved your testing, please call Central Scheduling at [phone]  Please allow our office 5 business days to contact you regarding any testing results.    Refill policies:   Allow 3 business days for refills; controlled substances may take longer and must be picked up from the office in person.  Narcotic medications can only be filled in 30 day increments and must be refilled at an office visit only.  If your prescription is due for a refill, you may be due for a follow-up appointment.  We cannot refill your maintenance medications at a preventative wellness visit.  To best provide you care, patients receiving maintenance medications need to be seen at least twice a year.

## 2024-11-06 NOTE — PROGRESS NOTES
Subjective:   Gigi Dewitt is a 74 year old female who presents for a Medicare Subsequent Annual Wellness visit (Pt already had Initial Annual Wellness) and scheduled follow up of multiple significant but stable problems.   Ms Dewitt is a very pleasant 74-year-old female with known history of arthritis, restless legs, insomnia, GERD, history of UTI, anxiety here today for her Medicare wellness exam.  She has been doing generally well.  She has great support from her family after the loss of her .  She would like refills for alprazolam that she does not take regularly but only takes it when she has difficulty with sleep.  No side effects when taking this meds. I had reviewed past medical and family histories together with allergy and medication lists documented.      History/Other:   Fall Risk Assessment:   She has been screened for Falls and is low risk.      Cognitive Assessment:   She had a completely normal cognitive assessment - see flowsheet entries       Functional Ability/Status:   Gigi Dewitt has a completely normal functional assessment. See flowsheet for details.      Depression Screening (PHQ):  PHQ-2 SCORE: 0  , done 11/6/2024            Advanced Directives:   She does have a Living Will but we do NOT have it on file in Epic.    She does have a POA but we do NOT have it on file in Epic.    Not discussed      Patient Active Problem List   Diagnosis    GERD (gastroesophageal reflux disease)    De Quervain's tenosynovitis    Chronic kidney disease, stage II (mild)    Plantar fasciitis    Benign colon polyp    Stomach ulcer    Osteoporosis, senile    Primary osteoarthritis of first carpometacarpal joint of left hand    Primary osteoarthritis of first carpometacarpal joint of right hand    Lumbar facet arthropathy    Spondylolisthesis, lumbar region    Restless leg syndrome    Personal history of colonic polyps    Impingement syndrome of right shoulder    Nontraumatic tear of right  rotator cuff    Anxiety     Allergies:  She is allergic to seasonal and dust mites.    Current Medications:  Outpatient Medications Marked as Taking for the 11/6/24 encounter (Office Visit) with Adelfo Gonzales MD   Medication Sig    ALPRAZolam 0.25 MG Oral Tab Take 1 tablet (0.25 mg total) by mouth 3 (three) times daily as needed.    clobetasol 0.05 % External Cream APPLY TO AFFECTED AREA EVERY DAY X1 MONTH AS NEEDED FOR FLARES    omeprazole 20 MG Oral Capsule Delayed Release Take one capsule (20 mg total) by mouth once daily, 30 minutes prior to breakfast.    rOPINIRole 0.25 MG Oral Tab Take 4 tablets (1 mg total) by mouth 4 (four) times daily.    MELOXICAM 15 MG Oral Tab TAKE 1 TABLET BY MOUTH EVERY DAY    Cholecalciferol (VITAMIN D) 2000 UNITS Oral Cap Take 1 capsule (2,000 Units total) by mouth daily.    Multiple Vitamin (MULTI-VITAMIN DAILY OR) Take 1 tablet by mouth daily.      Ascorbic Acid (VITAMIN C OR) Take 1 tablet by mouth daily.      Calcium Carbonate (CALCIUM 600 OR) Take 1 tablet by mouth daily.         Medical History:  She  has a past medical history of Abdominal distention, Abdominal pain, Anxiety (5/14/2024), Arthritis, Back pain, Back problem, Benign colon polyp (01/03/2011), Bloating, Chronic kidney disease, stage II (mild), Constipation, De Quervain's tenosynovitis, Diarrhea, unspecified, Eczema, Fatigue (??????), Fever (I????), Flatulence/gas pain/belching (????), GERD (gastroesophageal reflux disease), Headache disorder (High school), Heartburn (1972), Hemorrhoids (?????), Hiatal hernia, motion sickness, Labyrinthitis, Lumbar facet arthropathy (09/2018), Onychocryptosis, Osteoporosis, Sleep disturbance, Spondylolisthesis, lumbar region (09/2018), Stomach ulcer, Stress, Uncomfortable fullness after meals, Vestibular neuronitis, Visual impairment, and Wears glasses.  Surgical History:  She  has a past surgical history that includes appendectomy (1967); colonoscopy & polypectomy (1/13/2011);  other surgical history; repair intercarp/carp-metacarp jt (Left, 3/4/2015); transplant hand tendon (Left, 3/4/2015); fluoroscope examination (Left, 3/4/2015); removal deep implant (Left, 2015); fluoroscope examination (Left, 2015); colonoscopy; egd (2018); and Cataract extraction (Bilateral).   Family History:  Her family history includes Arthritis, Gout in her father; Cancer in her mother; Colon Polyps in her mother; Diabetes in her father; High Blood Pressure in her father; Other in her mother.  Social History:  She  reports that she quit smoking about 51 years ago. Her smoking use included cigarettes. She started smoking about 56 years ago. She has a 5 pack-year smoking history. She has never used smokeless tobacco. She reports current alcohol use of about 2.0 standard drinks of alcohol per week. She reports that she does not use drugs.    Tobacco:  She smoked tobacco in the past but quit greater than 12 months ago.  Social History     Tobacco Use   Smoking Status Former    Current packs/day: 0.00    Average packs/day: 1 pack/day for 5.0 years (5.0 ttl pk-yrs)    Types: Cigarettes    Start date: 1968    Quit date: 1973    Years since quittin.4   Smokeless Tobacco Never        CAGE Alcohol Screen:   CAGE screening score of 0 on 2024, showing low risk of alcohol abuse.      Patient Care Team:  Adelfo Gonzales MD as PCP - General (Family Medicine)  Rachell Bell DPM (PODIATRIST)  Dee Dee Mcmillan MD (DERMATOLOGY)  Krystal Coronado PT as Physical Therapist  Anat Euceda APRN (Nurse Practitioner)  Raphael Bear MD (GASTROENTEROLOGY)    Review of Systems  Constitutional:  Negative for fatigue and fever.   HENT:  Negative for sore throat and trouble swallowing.    Respiratory:  Negative for cough and shortness of breath.    Cardiovascular:  Negative for chest pain.   Gastrointestinal:  Negative for abdominal pain, constipation, diarrhea, nausea and vomiting.    Genitourinary:  Negative for hematuria.   Neurological:  Negative for dizziness, weakness, light-headedness and headaches.        Objective:   Physical Exam  Vitals reviewed.   Constitutional:       General: She is not in acute distress.  HENT:   Ears: normal Tms and EACs     Mouth/Throat:      Mouth: Mucous membranes are moist.      Pharynx: Oropharynx is clear.   Eyes:      General: No scleral icterus.     Conjunctiva/sclera: Conjunctivae normal.   Cardiovascular:      Rate and Rhythm: Normal rate and regular rhythm.      Heart sounds: Normal heart sounds. No murmur heard.  Pulmonary:      Effort: Pulmonary effort is normal. No respiratory distress.      Breath sounds: No wheezing or rales.   Abdominal:      General: Bowel sounds are normal. There is no distension.      Palpations: Abdomen is soft. There is no mass.      Tenderness: There is no abdominal tenderness.   Musculoskeletal:      Cervical back: Neck supple.      Right lower leg: No edema.      Left lower leg: No edema.   Lymphadenopathy:      Cervical: No cervical adenopathy.   Skin:     General: Skin is warm.   Neurological:      Mental Status: She is alert.   Psychiatric:         Mood and Affect: Mood normal.         Behavior: Behavior normal.     /74   Pulse 72   Temp 97.9 °F (36.6 °C) (Temporal)   Resp 16   Ht 5' 3\" (1.6 m)   Wt 151 lb (68.5 kg)   SpO2 98%   BMI 26.75 kg/m²  Estimated body mass index is 26.75 kg/m² as calculated from the following:    Height as of this encounter: 5' 3\" (1.6 m).    Weight as of this encounter: 151 lb (68.5 kg).      This note was prepared using Dragon Medical voice recognition dictation software. As a result errors may occur. When identified these errors have been corrected. While every attempt is made to correct errors during dictation discrepancies may still exist          Medicare Hearing Assessment:   Hearing Screening    Screening Method: Whisper Test  Whisper Test Result: Pass                Assessment & Plan:   Gigi Dewitt is a 74 year old female who presents for a Medicare Assessment.     1. Encounter for annual wellness exam in Medicare patient (Primary)  2. Anxiety  -     ALPRAZolam; Take 1 tablet (0.25 mg total) by mouth 3 (three) times daily as needed.  Dispense: 30 tablet; Refill: 1  3. Screening for endocrine, metabolic and immunity disorder  -     CBC With Differential With Platelet; Future; Expected date: 11/06/2024  -     Comp Metabolic Panel (14); Future; Expected date: 11/06/2024  -     TSH and Free T4; Future; Expected date: 11/06/2024  -     Lipid Panel; Future; Expected date: 11/06/2024  4. Encounter for annual health examination    The patient indicates understanding of these issues and agrees to the plan.  Continue with current treatment plan.  Follow up in  1  year(s).  Lab work ordered.  Patient reassured.  Prescription medication ordered.  Reinforced healthy diet, lifestyle, and exercise.      Return in about 1 year (around 11/6/2025), or if symptoms worsen or fail to improve, for wellness.     Adelfo Gonzales MD, 11/6/2024     Supplementary Documentation:   General Health:  In the past six months, have you lost more than 10 pounds without trying?: 2 - No  Has your appetite been poor?: No  Type of Diet: Balanced  How does the patient maintain a good energy level?: Daily Walks  How would you describe your daily physical activity?: Light  How would you describe your current health state?: Good  How do you maintain positive mental well-being?: Social Interaction;Puzzles;Games;Visiting Friends;Visiting Family  On a scale of 0 to 10, with 0 being no pain and 10 being severe pain, what is your pain level?: 0 - (None)  In the past six months, have you experienced urine leakage?: 1-Yes  At any time do you feel concerned for the safety/well-being of yourself and/or your children, in your home or elsewhere?: No  Have you had any immunizations at another office such as  Influenza, Hepatitis B, Tetanus, or Pneumococcal?: No    Health Maintenance   Topic Date Due    Pap Smear  10/24/2018    Pneumococcal Vaccine: 65+ Years (2 of 2 - PCV) 09/23/2021    Annual Depression Screening  01/01/2024    COVID-19 Vaccine (7 - 2023-24 season) 09/01/2024    Influenza Vaccine (1) 10/01/2024    Colorectal Cancer Screening  07/28/2025    Mammogram  09/05/2025    Annual Physical  11/06/2025    DEXA Scan  Completed    Fall Risk Screening (Annual)  Completed    Zoster Vaccines  Completed

## 2024-11-14 ENCOUNTER — LAB ENCOUNTER (OUTPATIENT)
Dept: LAB | Age: 74
End: 2024-11-14
Attending: FAMILY MEDICINE
Payer: MEDICARE

## 2024-11-14 DIAGNOSIS — Z13.0 SCREENING FOR ENDOCRINE, METABOLIC AND IMMUNITY DISORDER: ICD-10-CM

## 2024-11-14 DIAGNOSIS — Z13.29 SCREENING FOR ENDOCRINE, METABOLIC AND IMMUNITY DISORDER: ICD-10-CM

## 2024-11-14 DIAGNOSIS — Z13.228 SCREENING FOR ENDOCRINE, METABOLIC AND IMMUNITY DISORDER: ICD-10-CM

## 2024-11-14 DIAGNOSIS — M81.0 SENILE OSTEOPOROSIS: Primary | ICD-10-CM

## 2024-11-14 LAB
ALBUMIN SERPL-MCNC: 4.2 G/DL (ref 3.2–4.8)
ALBUMIN/GLOB SERPL: 1.6 {RATIO} (ref 1–2)
ALP LIVER SERPL-CCNC: 79 U/L
ALT SERPL-CCNC: 23 U/L
ANION GAP SERPL CALC-SCNC: 6 MMOL/L (ref 0–18)
ANION GAP SERPL CALC-SCNC: 7 MMOL/L (ref 0–18)
AST SERPL-CCNC: 25 U/L (ref ?–34)
BASOPHILS # BLD AUTO: 0.04 X10(3) UL (ref 0–0.2)
BASOPHILS NFR BLD AUTO: 0.5 %
BILIRUB SERPL-MCNC: 0.4 MG/DL (ref 0.2–1.1)
BUN BLD-MCNC: 29 MG/DL (ref 9–23)
BUN BLD-MCNC: 30 MG/DL (ref 9–23)
CALCIUM BLD-MCNC: 10.5 MG/DL (ref 8.7–10.4)
CALCIUM BLD-MCNC: 9.8 MG/DL (ref 8.7–10.4)
CHLORIDE SERPL-SCNC: 103 MMOL/L (ref 98–112)
CHLORIDE SERPL-SCNC: 104 MMOL/L (ref 98–112)
CHOLEST SERPL-MCNC: 243 MG/DL (ref ?–200)
CO2 SERPL-SCNC: 24 MMOL/L (ref 21–32)
CO2 SERPL-SCNC: 28 MMOL/L (ref 21–32)
CREAT BLD-MCNC: 1.08 MG/DL
CREAT BLD-MCNC: 1.11 MG/DL
EGFRCR SERPLBLD CKD-EPI 2021: 53 ML/MIN/1.73M2 (ref 60–?)
EGFRCR SERPLBLD CKD-EPI 2021: 55 ML/MIN/1.73M2 (ref 60–?)
EOSINOPHIL # BLD AUTO: 0.23 X10(3) UL (ref 0–0.7)
EOSINOPHIL NFR BLD AUTO: 2.8 %
ERYTHROCYTE [DISTWIDTH] IN BLOOD BY AUTOMATED COUNT: 12.3 %
FASTING PATIENT LIPID ANSWER: YES
FASTING STATUS PATIENT QL REPORTED: YES
FASTING STATUS PATIENT QL REPORTED: YES
GLOBULIN PLAS-MCNC: 2.6 G/DL (ref 2–3.5)
GLUCOSE BLD-MCNC: 101 MG/DL (ref 70–99)
GLUCOSE BLD-MCNC: 105 MG/DL (ref 70–99)
HCT VFR BLD AUTO: 40.9 %
HDLC SERPL-MCNC: 69 MG/DL (ref 40–59)
HGB BLD-MCNC: 13.2 G/DL
IMM GRANULOCYTES # BLD AUTO: 0.03 X10(3) UL (ref 0–1)
IMM GRANULOCYTES NFR BLD: 0.4 %
LDLC SERPL CALC-MCNC: 147 MG/DL (ref ?–100)
LYMPHOCYTES # BLD AUTO: 1.52 X10(3) UL (ref 1–4)
LYMPHOCYTES NFR BLD AUTO: 18.4 %
MCH RBC QN AUTO: 29.7 PG (ref 26–34)
MCHC RBC AUTO-ENTMCNC: 32.3 G/DL (ref 31–37)
MCV RBC AUTO: 91.9 FL
MONOCYTES # BLD AUTO: 0.56 X10(3) UL (ref 0.1–1)
MONOCYTES NFR BLD AUTO: 6.8 %
NEUTROPHILS # BLD AUTO: 5.86 X10 (3) UL (ref 1.5–7.7)
NEUTROPHILS # BLD AUTO: 5.86 X10(3) UL (ref 1.5–7.7)
NEUTROPHILS NFR BLD AUTO: 71.1 %
NONHDLC SERPL-MCNC: 174 MG/DL (ref ?–130)
OSMOLALITY SERPL CALC.SUM OF ELEC: 286 MOSM/KG (ref 275–295)
OSMOLALITY SERPL CALC.SUM OF ELEC: 290 MOSM/KG (ref 275–295)
PLATELET # BLD AUTO: 319 10(3)UL (ref 150–450)
POTASSIUM SERPL-SCNC: 4.9 MMOL/L (ref 3.5–5.1)
POTASSIUM SERPL-SCNC: 4.9 MMOL/L (ref 3.5–5.1)
PROT SERPL-MCNC: 6.8 G/DL (ref 5.7–8.2)
RBC # BLD AUTO: 4.45 X10(6)UL
SODIUM SERPL-SCNC: 135 MMOL/L (ref 136–145)
SODIUM SERPL-SCNC: 137 MMOL/L (ref 136–145)
T4 FREE SERPL-MCNC: 1.2 NG/DL (ref 0.8–1.7)
TRIGL SERPL-MCNC: 154 MG/DL (ref 30–149)
TSI SER-ACNC: 1.42 UIU/ML (ref 0.55–4.78)
VIT D+METAB SERPL-MCNC: 66.6 NG/ML (ref 30–100)
VLDLC SERPL CALC-MCNC: 29 MG/DL (ref 0–30)
WBC # BLD AUTO: 8.2 X10(3) UL (ref 4–11)

## 2024-11-14 PROCEDURE — 84443 ASSAY THYROID STIM HORMONE: CPT

## 2024-11-14 PROCEDURE — 80048 BASIC METABOLIC PNL TOTAL CA: CPT

## 2024-11-14 PROCEDURE — 84439 ASSAY OF FREE THYROXINE: CPT

## 2024-11-14 PROCEDURE — 36415 COLL VENOUS BLD VENIPUNCTURE: CPT

## 2024-11-14 PROCEDURE — 80053 COMPREHEN METABOLIC PANEL: CPT

## 2024-11-14 PROCEDURE — 80061 LIPID PANEL: CPT

## 2024-11-14 PROCEDURE — 82306 VITAMIN D 25 HYDROXY: CPT

## 2024-11-14 PROCEDURE — 85025 COMPLETE CBC W/AUTO DIFF WBC: CPT

## 2024-11-26 RX ORDER — ROPINIROLE 0.25 MG/1
1 TABLET, FILM COATED ORAL 4 TIMES DAILY
Qty: 360 TABLET | Refills: 1 | Status: SHIPPED | OUTPATIENT
Start: 2024-11-26

## 2024-12-16 ENCOUNTER — EKG ENCOUNTER (OUTPATIENT)
Dept: LAB | Age: 74
End: 2024-12-16
Attending: FAMILY MEDICINE
Payer: MEDICARE

## 2024-12-16 ENCOUNTER — OFFICE VISIT (OUTPATIENT)
Dept: FAMILY MEDICINE CLINIC | Facility: CLINIC | Age: 74
End: 2024-12-16
Payer: MEDICARE

## 2024-12-16 ENCOUNTER — LAB ENCOUNTER (OUTPATIENT)
Dept: LAB | Age: 74
End: 2024-12-16
Attending: FAMILY MEDICINE
Payer: MEDICARE

## 2024-12-16 VITALS
RESPIRATION RATE: 16 BRPM | OXYGEN SATURATION: 98 % | HEART RATE: 76 BPM | BODY MASS INDEX: 26.75 KG/M2 | SYSTOLIC BLOOD PRESSURE: 122 MMHG | TEMPERATURE: 98 F | WEIGHT: 151 LBS | DIASTOLIC BLOOD PRESSURE: 74 MMHG | HEIGHT: 63 IN

## 2024-12-16 DIAGNOSIS — Z01.818 PREOPERATIVE CLEARANCE: Primary | ICD-10-CM

## 2024-12-16 DIAGNOSIS — Z01.818 PREOPERATIVE CLEARANCE: ICD-10-CM

## 2024-12-16 DIAGNOSIS — S92.355D CLOSED NONDISPLACED FRACTURE OF FIFTH METATARSAL BONE OF LEFT FOOT WITH ROUTINE HEALING, SUBSEQUENT ENCOUNTER: ICD-10-CM

## 2024-12-16 LAB
ATRIAL RATE: 76 BPM
BASOPHILS # BLD AUTO: 0.07 X10(3) UL (ref 0–0.2)
BASOPHILS NFR BLD AUTO: 0.6 %
EOSINOPHIL # BLD AUTO: 0.32 X10(3) UL (ref 0–0.7)
EOSINOPHIL NFR BLD AUTO: 2.5 %
ERYTHROCYTE [DISTWIDTH] IN BLOOD BY AUTOMATED COUNT: 12.4 %
HCT VFR BLD AUTO: 42.1 %
HGB BLD-MCNC: 13.2 G/DL
IMM GRANULOCYTES # BLD AUTO: 0.05 X10(3) UL (ref 0–1)
IMM GRANULOCYTES NFR BLD: 0.4 %
LYMPHOCYTES # BLD AUTO: 1.48 X10(3) UL (ref 1–4)
LYMPHOCYTES NFR BLD AUTO: 11.8 %
MCH RBC QN AUTO: 29.3 PG (ref 26–34)
MCHC RBC AUTO-ENTMCNC: 31.4 G/DL (ref 31–37)
MCV RBC AUTO: 93.6 FL
MONOCYTES # BLD AUTO: 0.66 X10(3) UL (ref 0.1–1)
MONOCYTES NFR BLD AUTO: 5.2 %
NEUTROPHILS # BLD AUTO: 10.01 X10 (3) UL (ref 1.5–7.7)
NEUTROPHILS # BLD AUTO: 10.01 X10(3) UL (ref 1.5–7.7)
NEUTROPHILS NFR BLD AUTO: 79.5 %
P AXIS: 47 DEGREES
P-R INTERVAL: 140 MS
PLATELET # BLD AUTO: 312 10(3)UL (ref 150–450)
Q-T INTERVAL: 406 MS
QRS DURATION: 78 MS
QTC CALCULATION (BEZET): 456 MS
R AXIS: 11 DEGREES
RBC # BLD AUTO: 4.5 X10(6)UL
T AXIS: 45 DEGREES
VENTRICULAR RATE: 76 BPM
WBC # BLD AUTO: 12.6 X10(3) UL (ref 4–11)

## 2024-12-16 PROCEDURE — 99214 OFFICE O/P EST MOD 30 MIN: CPT | Performed by: FAMILY MEDICINE

## 2024-12-16 PROCEDURE — G2211 COMPLEX E/M VISIT ADD ON: HCPCS | Performed by: FAMILY MEDICINE

## 2024-12-16 PROCEDURE — 85025 COMPLETE CBC W/AUTO DIFF WBC: CPT

## 2024-12-16 PROCEDURE — 93010 ELECTROCARDIOGRAM REPORT: CPT | Performed by: INTERNAL MEDICINE

## 2024-12-16 PROCEDURE — 93005 ELECTROCARDIOGRAM TRACING: CPT

## 2024-12-16 PROCEDURE — 36415 COLL VENOUS BLD VENIPUNCTURE: CPT

## 2024-12-16 NOTE — H&P
Preoperative History and Physical Family Medicine    CC:   Chief Complaint   Patient presents with    Pre-Op Exam     Scheduled 12/19 w/ for L foot surgery       Gigi Dewitt is 74 year old presenting for a preoperative exam.    This patient is having surgery on date: 12/19 for procedure: ORIF, L 5th metatasal  I'm seeing the patient at the request from : Dr. Ronak Marin because of preop clearance  Reporting mechanism back to the doctor via fax and this note.    HPI:   Ms Dewitt is a very pleasant 74-year-old female with known history of arthritis, restless legs, insomnia, GERD, history of UTI, anxiety here today for her preoperative clearance.  She recently fractured her left fifth metatarsal last Thanksgiving when she had slipped off a curb while in [place] visiting her son.  She is scheduled to undergo ORIF of that area.  No side effects or complications from anesthesia and surgical procedures that she has had.  No bleeding tendencies.  No fever no cough no chest pain no shortness of breath no nausea no vomiting no abdominal pain.    I had reviewed past medical and family histories together with allergy and medication lists documented.      Past Medical History:    Abdominal distention    Abdominal pain    Anxiety    Arthritis    left basal joint     Back pain    Back problem    Benign colon polyp    Bloating    Chronic kidney disease, stage II (mild)    pt denies    Constipation    De Quervain's tenosynovitis    Diarrhea, unspecified    Eczema    hand    Fatigue    Fever    Flatulence/gas pain/belching    GERD (gastroesophageal reflux disease)    Headache disorder    Heartburn    Hemorrhoids    Hiatal hernia    Hx of motion sickness    Labyrinthitis    Lumbar facet arthropathy    Onychocryptosis    second digit, right foot    Osteoporosis    Sleep disturbance    Spondylolisthesis, lumbar region    Stomach ulcer    Stress    Uncomfortable fullness after meals    Vestibular neuronitis    Visual  impairment    Wears glasses       Past Surgical History:   Procedure Laterality Date    Appendectomy  1967    [place]    Cataract extraction Bilateral     Colonoscopy      Colonoscopy & polypectomy  2011    Egd  2018    hiatal hernia, gastritis, no Montaño's, cont PPI, Dr. Bear    Fluoroscope examination Left 3/4/2015    Procedure: BASAL JOINT ARTHROPLASTY USING APL TENDON TRANSFER RECONSTRUCTION;  Surgeon: Rex Alonso MD;  Location: [facility]    Fluoroscope examination Left 2015    Procedure: REMOVAL HARDWARE UPPER EXTREMITY;  Surgeon: Rex Alonso MD;  [facility]    Other surgical history      Removal deep implant Left 2015    Procedure: REMOVAL HARDWARE UPPER EXTREMITY;  Surgeon: Rex Alonso MD;  [facility]    Repair intercarp/carp-metacarp jt Left 3/4/2015    Procedure: BASAL JOINT ARTHROPLASTY USING APL TENDON TRANSFER RECONSTRUCTION;  Surgeon: Rex Alonso MD;  Location: [facility]    Transplant hand tendon Left 3/4/2015    Procedure: BASAL JOINT ARTHROPLASTY USING APL TENDON TRANSFER RECONSTRUCTION;  Surgeon: Rex Alonso MD;  Location: [facility]       Social History:  Social History     Socioeconomic History    Marital status:    Tobacco Use    Smoking status: Former     Current packs/day: 0.00     Average packs/day: 1 pack/day for 5.0 years (5.0 ttl pk-yrs)     Types: Cigarettes     Start date: 1968     Quit date: 1973     Years since quittin.5    Smokeless tobacco: Never   Vaping Use    Vaping status: Never Used   Substance and Sexual Activity    Alcohol use: Yes     Alcohol/week: 2.0 standard drinks of alcohol     Types: 2 Standard drinks or equivalent per week     Comment: social    Drug use: No   Other Topics Concern    Caffeine Concern Yes     Comment: 3-4 cups per week    Weight Concern No    Exercise Yes    Seat Belt Yes       Family History   Problem Relation Age of Onset    Diabetes Father      High Blood Pressure Father     Other (Arthritis, Gout) Father     Cancer Mother     Other (Other) Mother     Colon Polyps Mother        Allergies:  Allergies[1]  Current Meds:    Current Outpatient Medications:     ROPINIROLE 0.25 MG Oral Tab, TAKE 4 TABLETS (1 MG TOTAL) BY MOUTH 4 (FOUR) TIMES DAILY., Disp: 360 tablet, Rfl: 1    ALPRAZolam 0.25 MG Oral Tab, Take 1 tablet (0.25 mg total) by mouth 3 (three) times daily as needed., Disp: 30 tablet, Rfl: 1    clobetasol 0.05 % External Cream, APPLY TO AFFECTED AREA EVERY DAY X1 MONTH AS NEEDED FOR FLARES, Disp: , Rfl:     omeprazole 20 MG Oral Capsule Delayed Release, Take one capsule (20 mg total) by mouth once daily, 30 minutes prior to breakfast., Disp: 90 capsule, Rfl: 3    MELOXICAM 15 MG Oral Tab, TAKE 1 TABLET BY MOUTH EVERY DAY, Disp: 90 tablet, Rfl: 0    Cholecalciferol (VITAMIN D) 2000 UNITS Oral Cap, Take 1 capsule (2,000 Units total) by mouth daily., Disp: , Rfl:     Multiple Vitamin (MULTI-VITAMIN DAILY OR), Take 1 tablet by mouth daily.  , Disp: , Rfl:     Ascorbic Acid (VITAMIN C OR), Take 1 tablet by mouth daily.  , Disp: , Rfl:     Calcium Carbonate (CALCIUM 600 OR), Take 1 tablet by mouth daily.  , Disp: , Rfl:     Review of Systems   Review of Systems  Constitutional:  Negative for fatigue and fever.   HENT:  Negative for sore throat and trouble swallowing.    Respiratory:  Negative for cough and shortness of breath.    Cardiovascular:  Negative for chest pain.   Gastrointestinal:  Negative for abdominal pain, constipation, diarrhea, nausea and vomiting.   Genitourinary:  Negative for hematuria.   Neurological:  Negative for dizziness, weakness, light-headedness and headaches.      Physical Exam   /74   Pulse 76   Temp 97.9 °F (36.6 °C) (Temporal)   Resp 16   Ht 5' 3\" (1.6 m)   Wt 151 lb (68.5 kg)   SpO2 98%   BMI 26.75 kg/m²   Physical Exam  Vitals reviewed.   Constitutional:       General: She is not in acute distress.  HENT:   Ears:  normal Tms and EACs     Mouth/Throat:      Mouth: Mucous membranes are moist.      Pharynx: Oropharynx is clear.   Eyes:      General: No scleral icterus.     Conjunctiva/sclera: Conjunctivae normal.   Cardiovascular:      Rate and Rhythm: Normal rate and regular rhythm.      Heart sounds: Normal heart sounds. No murmur heard.  Pulmonary:      Effort: Pulmonary effort is normal. No respiratory distress.      Breath sounds: No wheezing or rales.   Abdominal:      General: Bowel sounds are normal. There is no distension.      Palpations: Abdomen is soft. There is no mass.      Tenderness: There is no abdominal tenderness.   Musculoskeletal:      Cervical back: Neck supple.      Right lower leg: No edema.      Left lower leg: No edema.   Lymphadenopathy:      Cervical: No cervical adenopathy.   Skin:     General: Skin is warm.   Neurological:      Mental Status: She is alert.   Psychiatric:         Mood and Affect: Mood normal.         Behavior: Behavior normal.   No visits with results within 1 Month(s) from this visit.   Latest known visit with results is:   [facility] Lab Encounter on 11/14/2024   Component Date Value    WBC 11/14/2024 8.2     RBC 11/14/2024 4.45     HGB 11/14/2024 13.2     HCT 11/14/2024 40.9     PLT 11/14/2024 319.0     MCV 11/14/2024 91.9     MCH 11/14/2024 29.7     MCHC 11/14/2024 32.3     RDW 11/14/2024 12.3     Neutrophil Absolute Prel* 11/14/2024 5.86     Neutrophil Absolute 11/14/2024 5.86     Lymphocyte Absolute 11/14/2024 1.52     Monocyte Absolute 11/14/2024 0.56     Eosinophil Absolute 11/14/2024 0.23     Basophil Absolute 11/14/2024 0.04     Immature Granulocyte Abs* 11/14/2024 0.03     Neutrophil % 11/14/2024 71.1     Lymphocyte % 11/14/2024 18.4     Monocyte % 11/14/2024 6.8     Eosinophil % 11/14/2024 2.8     Basophil % 11/14/2024 0.5     Immature Granulocyte % 11/14/2024 0.4     Glucose 11/14/2024 105 (H)     Sodium 11/14/2024 135 (L)     Potassium 11/14/2024 4.9     Chloride 11/14/2024  104     CO2 11/14/2024 24.0     Anion Gap 11/14/2024 7     BUN 11/14/2024 29 (H)     Creatinine 11/14/2024 1.08 (H)     Calcium, Total 11/14/2024 10.5 (H)     Calculated Osmolality 11/14/2024 286     eGFR-Cr 11/14/2024 55 (L)     AST 11/14/2024 25     ALT 11/14/2024 23     Alkaline Phosphatase 11/14/2024 79     Bilirubin, Total 11/14/2024 0.4     Total Protein 11/14/2024 6.8     Albumin 11/14/2024 4.2     Globulin  11/14/2024 2.6     A/G Ratio 11/14/2024 1.6     Patient Fasting for CMP? 11/14/2024 Yes     Free T4 11/14/2024 1.2     TSH 11/14/2024 1.415     Cholesterol, Total 11/14/2024 243 (H)     HDL Cholesterol 11/14/2024 69 (H)     Triglycerides 11/14/2024 154 (H)     LDL Cholesterol 11/14/2024 147 (H)     VLDL 11/14/2024 29     Non HDL Chol 11/14/2024 174 (H)     Patient Fasting for Lipi* 11/14/2024 Yes     Vitamin D, 25OH, Total 11/14/2024 66.6     Glucose 11/14/2024 101 (H)     Sodium 11/14/2024 137     Potassium 11/14/2024 4.9     Chloride 11/14/2024 103     CO2 11/14/2024 28.0     Anion Gap 11/14/2024 6     BUN 11/14/2024 30 (H)     Creatinine 11/14/2024 1.11 (H)     Calcium, Total 11/14/2024 9.8     Calculated Osmolality 11/14/2024 290     eGFR-Cr 11/14/2024 53 (L)     Patient Fasting for BMP? 11/14/2024 Yes        Assessment and Plan:  Gigi Dewitt is a 74 year old female here for   Chief Complaint   Patient presents with    Pre-Op Exam     Scheduled 12/19 w/ for L foot surgery     1. Preoperative clearance    2. Closed nondisplaced fracture of fifth metatarsal bone of left foot with routine healing, subsequent encounter      Patient is medically cleared to undergo planned procedure.  He is at low risk for developing perioperative and postoperative cardiac complications.  Please contact my office if questions or concerns. Patient was advised to hold any blood thinners, anti-inflammatories, or supplements that patient may be taking at least 7-10 days prior to procedure.  I will forward my  notes to  for review.    Patient/Caregiver Education: Patient/Caregiver Education: There are no barriers to learning. Medical education done. Outcome: Patient verbalizes understanding.      This note was prepared using Dragon Medical voice recognition dictation software. As a result errors may occur. When identified these errors have been corrected. While every attempt is made to correct errors during dictation discrepancies may still exist          Orders Placed This Encounter   Procedures    CBC With Differential With Platelet     Standing Status:   Future     Standing Expiration Date:   12/16/2025         Procedures    CBC With Differential With Platelet     Requested Prescriptions      No prescriptions requested or ordered in this encounter     OFFICE/OUTPT VISIT,EST,LEVL IV        [1]   Allergies  Allergen Reactions    Seasonal OTHER (SEE COMMENTS)     Runny nose, eyes    Dust Mites ITCHING

## 2024-12-18 ENCOUNTER — TELEPHONE (OUTPATIENT)
Dept: FAMILY MEDICINE CLINIC | Facility: CLINIC | Age: 74
End: 2024-12-18

## 2024-12-18 NOTE — TELEPHONE ENCOUNTER
Office calling, requesting patient's EKG results. Also requesting to include tracing, can fax to [phone]

## 2024-12-18 NOTE — TELEPHONE ENCOUNTER
EKG tracing and results from 12/16/2024 printed.  Faxed to [facility] at [phone].  Fax confirmation received.

## 2025-04-07 ENCOUNTER — APPOINTMENT (OUTPATIENT)
Dept: ULTRASOUND IMAGING | Age: 75
End: 2025-04-07
Attending: PHYSICIAN ASSISTANT
Payer: MEDICARE

## 2025-04-07 ENCOUNTER — HOSPITAL ENCOUNTER (EMERGENCY)
Age: 75
Discharge: HOME | End: 2025-04-07
Attending: EMERGENCY MEDICINE
Payer: MEDICARE

## 2025-04-07 VITALS
HEART RATE: 56 BPM | HEIGHT: 63 IN | RESPIRATION RATE: 16 BRPM | OXYGEN SATURATION: 97 % | WEIGHT: 150 LBS | BODY MASS INDEX: 26.58 KG/M2 | DIASTOLIC BLOOD PRESSURE: 78 MMHG | TEMPERATURE: 97 F | SYSTOLIC BLOOD PRESSURE: 168 MMHG

## 2025-04-07 DIAGNOSIS — M79.662 PAIN OF LEFT CALF: Primary | ICD-10-CM

## 2025-04-07 PROCEDURE — 99284 EMERGENCY DEPT VISIT MOD MDM: CPT

## 2025-04-07 PROCEDURE — 93971 EXTREMITY STUDY: CPT | Performed by: PHYSICIAN ASSISTANT

## 2025-04-07 NOTE — ED PROVIDER NOTES
Patient Seen in: [facility] In [place]      History     Chief Complaint   Patient presents with    Deep Vein Thrombosis     Stated Complaint: sent by PCP to r/o dvt left leg. pain to upper and lower leg    Subjective:   HPI      74-year-old female sent by PCP to rule out DVT in the left lower extremity.  She is currently in a walking boot due to a foot fracture.  She has been in this boot for several weeks and reports pain in the left posterior calf region.  She denies any swelling, bruising, discoloration.  She said it feels more like a muscle strain but her PCP wanted her to make sure she does not have a blood clot.  She denies any chest pain, palpitation, shortness of breath.    Objective:     Past Medical History:    Abdominal distention    Abdominal pain    Anxiety    Arthritis    left basal joint     Back pain    Back problem    Benign colon polyp    Bloating    Chronic kidney disease, stage II (mild)    pt denies    Constipation    De Quervain's tenosynovitis    Diarrhea, unspecified    Eczema    hand    Fatigue    Fever    Flatulence/gas pain/belching    GERD (gastroesophageal reflux disease)    Headache disorder    Heartburn    Hemorrhoids    Hiatal hernia    Hx of motion sickness    Labyrinthitis    Lumbar facet arthropathy    Onychocryptosis    second digit, right foot    Osteoporosis    Sleep disturbance    Spondylolisthesis, lumbar region    Stomach ulcer    Stress    Uncomfortable fullness after meals    Vestibular neuronitis    Visual impairment    Wears glasses              Past Surgical History:   Procedure Laterality Date    Appendectomy  1967    [facility]    Cataract extraction Bilateral     Cholecystectomy Right     Colonoscopy      Colonoscopy & polypectomy  01/13/2011    [facility]  12/20/2018    hiatal hernia, gastritis, no Montaño's, cont PPI, Dr. Bear    Fluoroscope examination Left 03/04/2015    Procedure: BASAL JOINT ARTHROPLASTY USING APL TENDON TRANSFER  RECONSTRUCTION;  Surgeon: Rex Alonso MD;  Location: [facility]    Fluoroscope examination Left 2015    Procedure: REMOVAL HARDWARE UPPER EXTREMITY;  Surgeon: Rex Alonso MD;  Location: [facility]    Foot surgery Left     Other surgical history      Removal deep implant Left 2015    Procedure: REMOVAL HARDWARE UPPER EXTREMITY;  Surgeon: Rex Alonso MD;  Location: [facility]    Repair intercarp/carp-metacarp jt Left 2015    Procedure: BASAL JOINT ARTHROPLASTY USING APL TENDON TRANSFER RECONSTRUCTION;  Surgeon: Rex Alonso MD;  Location: [facility]    Transplant hand tendon Left 2015    Procedure: BASAL JOINT ARTHROPLASTY USING APL TENDON TRANSFER RECONSTRUCTION;  Surgeon: Rex Alonso MD;  Location: [facility]                Social History     Socioeconomic History    Marital status:    Tobacco Use    Smoking status: Former     Current packs/day: 0.00     Average packs/day: 1 pack/day for 5.0 years (5.0 ttl pk-yrs)     Types: Cigarettes     Start date: 1968     Quit date: 1973     Years since quittin.8    Smokeless tobacco: Never   Vaping Use    Vaping status: Never Used   Substance and Sexual Activity    Alcohol use: Yes     Alcohol/week: 2.0 standard drinks of alcohol     Types: 2 Standard drinks or equivalent per week     Comment: social    Drug use: No   Other Topics Concern    Caffeine Concern Yes     Comment: 3-4 cups per week    Weight Concern No    Exercise Yes    Seat Belt Yes                  Physical Exam     ED Triage Vitals [25 1528]   /88   Pulse 66   Resp 18   Temp 97.4 °F (36.3 °C)   Temp src Temporal   SpO2 99 %   O2 Device None (Room air)       Current Vitals:   Vital Signs  BP: (!) 168/78  Pulse: 56  Resp: 16  Temp: 97.4 °F (36.3 °C)  Temp src: Temporal    Oxygen Therapy  SpO2: 97 %  O2 Device: None (Room air)        Physical Exam  General:  Vitals as listed.  No acute distress   Extremities: Completed  short no erythema, warmth, ecchymosis to the left lower extremity.  There is reproducible tenderness on palpation to the medial gastrocnemius area.  No palpable soft tissue masses.  No edema, normal peripheral pulses   Neuro: Alert oriented and nonfocal   Skin: no rashes or nodules    ED Course   Labs Reviewed - No data to display         US VENOUS DOPPLER LEG LEFT - DIAG IMG (CPT=93971)    Result Date: 4/7/2025  PROCEDURE:  US VENOUS DOPPLER LEG LEFT - DIAG IMG (CPT=93971)  COMPARISON:  None.  INDICATIONS:  Left lower extremity pain.  TECHNIQUE:  Real time, grey scale, and duplex ultrasound was used to evaluate the lower extremity venous system. B-mode two-dimensional images of the vascular structures, Doppler spectral analysis, and color flow.  Doppler imaging were performed.  The following veins were imaged:  Common, deep, and superficial femoral, popliteal, sapheno-femoral junction, posterior tibial veins, and the contralateral common femoral vein.  PATIENT STATED HISTORY: (As transcribed by Technologist)  pain in left outer calf    FINDINGS:  EXTREMITY EXAMINED:  Left lower extremity. SAPHENOFEMORAL JUNCTION:  No reflux. THROMBI:  None visible. COMPRESSION:  Normal compressibility, phasicity, and augmentation. OTHER:  Negative.            CONCLUSION:  Negative exam.  No evidence of left lower extremity DVT.   LOCATION:  [facility]    Dictated by (CST): Roscoe Lowe DO on 4/07/2025 at 4:53 PM     Finalized by (CST): Roscoe Lowe DO on 4/07/2025 at 4:53 PM             MDM      74-year-old female presents reporting pain in the left calf region.  Currently in a walking boot due to a midfoot fracture.  Sent by PCP to rule out DVT    Differential includes but is not limited to muscle strain, DVT, a life/function threat.    Ultrasound of the left lower extremity ordered for further evaluation.    My independent interpretation of ultrasound of the left leg is that there is no obvious DVT.  Also reviewed radiology  documentation which reports negative ultrasound of the left leg.  We did discuss that its likely related to the walking boot she is in and when she is out of it her pain should improve.  She is relieved there is no DVT and will follow-up with her Ortho specialist.  Reviewed these findings with the patient.            Medical Decision Making      Disposition and Plan     Clinical Impression:  1. Pain of left calf         Disposition:  Discharge  4/7/2025  5:24 pm    Follow-up:  Adelfo Gonzales MD  [address] [address] [address]  [phone]    Schedule an appointment as soon as possible for a visit            Medications Prescribed:  Discharge Medication List as of 4/7/2025  5:36 PM              Supplementary Documentation:

## 2025-04-07 NOTE — ED QUICK NOTES
To ER for eval of left popliteal discomfort and left calf pain. The pt states she had an x-ray at her orthopedists yesterday to check on the status of her fracture to the left foot. She had fractured it in ~ December and then reinjured it recently. She is wearing an orthopedic boot and isn't sure if it's what's causing her the discomfort. The left pedal pulse is 1+ and the right is 2+.

## 2025-04-07 NOTE — ED INITIAL ASSESSMENT (HPI)
Pt to ED with left posterior thigh and calf pain since yesterday. Sent by PCP to r/o DVT. Pt with hx of surgery to left foot and recent fracture.

## 2025-04-07 NOTE — DISCHARGE INSTRUCTIONS
Ultrasound does not show an obvious reason for the pain in your left calf region.  Thankfully, it also does not show evidence of a DVT.  This could be related to the boot you are currently ambulating in.  Please continue monitoring the symptoms and return with any concerns.  You can try ice/heat to the area of discomfort as well as anti-inflammatories such as ibuprofen.

## 2025-05-01 RX ORDER — ROPINIROLE 0.25 MG/1
1 TABLET, FILM COATED ORAL NIGHTLY
Qty: 90 TABLET | Refills: 1 | Status: SHIPPED | OUTPATIENT
Start: 2025-05-01

## 2025-05-13 ENCOUNTER — PATIENT MESSAGE (OUTPATIENT)
Dept: FAMILY MEDICINE CLINIC | Facility: CLINIC | Age: 75
End: 2025-05-13

## 2025-05-13 ENCOUNTER — TELEPHONE (OUTPATIENT)
Dept: FAMILY MEDICINE CLINIC | Facility: CLINIC | Age: 75
End: 2025-05-13

## 2025-05-13 ENCOUNTER — OFFICE VISIT (OUTPATIENT)
Dept: FAMILY MEDICINE CLINIC | Facility: CLINIC | Age: 75
End: 2025-05-13
Payer: MEDICARE

## 2025-05-13 VITALS
BODY MASS INDEX: 28.35 KG/M2 | OXYGEN SATURATION: 98 % | TEMPERATURE: 98 F | WEIGHT: 160 LBS | DIASTOLIC BLOOD PRESSURE: 80 MMHG | HEIGHT: 63 IN | RESPIRATION RATE: 16 BRPM | SYSTOLIC BLOOD PRESSURE: 132 MMHG | HEART RATE: 74 BPM

## 2025-05-13 DIAGNOSIS — R73.03 PREDIABETES: Primary | ICD-10-CM

## 2025-05-13 DIAGNOSIS — E78.5 DYSLIPIDEMIA: ICD-10-CM

## 2025-05-13 DIAGNOSIS — E66.3 OVERWEIGHT (BMI 25.0-29.9): ICD-10-CM

## 2025-05-13 PROCEDURE — 99214 OFFICE O/P EST MOD 30 MIN: CPT | Performed by: FAMILY MEDICINE

## 2025-05-13 PROCEDURE — G2211 COMPLEX E/M VISIT ADD ON: HCPCS | Performed by: FAMILY MEDICINE

## 2025-05-13 RX ORDER — TIRZEPATIDE 2.5 MG/.5ML
2.5 INJECTION, SOLUTION SUBCUTANEOUS WEEKLY
Qty: 2 ML | Refills: 0 | Status: SHIPPED | OUTPATIENT
Start: 2025-05-13

## 2025-05-13 NOTE — PROGRESS NOTES
Subjective:   Patient ID: Gigi Dewitt is a 75 year old female.    HPI  Ms Dewitt is a very pleasant 75-year-old female with known history of arthritis, restless legs, insomnia, GERD, history of UTI, anxiety here for laboratory test reviewed which she had done through [facility].  She would like to try GLP-1 medications to help her lose weight.  She has had struggles with losing weight.  She had labs done which included lipid panel thyroid, kidney function and hemoglobin A1c.  She feels well overall.      I had reviewed past medical and family histories together with allergy and medication lists documented.    History/Other:   Review of Systems  Constitutional:  Negative for fatigue and fever.   HENT:  Negative for sore throat and trouble swallowing.    Respiratory:  Negative for cough and shortness of breath.    Cardiovascular:  Negative for chest pain.   Gastrointestinal:  Negative for abdominal pain, constipation, diarrhea, nausea and vomiting.   Genitourinary:  Negative for hematuria.   Neurological:  Negative for dizziness, weakness, light-headedness and headaches.   Current Medications[1]  Allergies:Allergies[2]    Objective:   Physical Exam  Vitals reviewed.   Constitutional:       General: She is not in acute distress.  HENT:   Ears: normal Tms and EACs     Mouth/Throat:      Mouth: Mucous membranes are moist.      Pharynx: Oropharynx is clear.   Eyes:      General: No scleral icterus.     Conjunctiva/sclera: Conjunctivae normal.   Cardiovascular:      Rate and Rhythm: Normal rate and regular rhythm.      Heart sounds: Normal heart sounds. No murmur heard.  Pulmonary:      Effort: Pulmonary effort is normal. No respiratory distress.      Breath sounds: No wheezing or rales.   Abdominal:      General: Bowel sounds are normal. There is no distension.      Palpations: Abdomen is soft. There is no mass.      Tenderness: There is no abdominal tenderness.   Musculoskeletal:      Cervical back: Neck supple.       Right lower leg: No edema.      Left lower leg: No edema.   Lymphadenopathy:      Cervical: No cervical adenopathy.   Skin:     General: Skin is warm.   Neurological:      Mental Status: She is alert.   Psychiatric:         Mood and Affect: Mood normal.         Behavior: Behavior normal.   Assessment & Plan:   1. Prediabetes    2. Overweight (BMI 25.0-29.9)    3. Dyslipidemia    A1c at 5.8%.  Total cholesterol is 274, HDL cholesterol 74, LDL cholesterol 171, renal function is normal.  TSH is normal.  I think that she would benefit from GLP-1 medications but will start on Zepbound 2.5 mg every week.  I had explained to her that the insurance may not cover this.  Discussed possible side effects which include but not limited to allergic reaction, nausea, vomiting, abdominal pain, diarrhea, constipation, lightheadedness, dizziness.  Follow-up with me after 4 weeks after initiation of this medication.      This note was prepared using Dragon Medical voice recognition dictation software. As a result errors may occur. When identified these errors have been corrected. While every attempt is made to correct errors during dictation discrepancies may still exist            No orders of the defined types were placed in this encounter.      Meds This Visit:  Requested Prescriptions     Signed Prescriptions Disp Refills    Tirzepatide-Weight Management (ZEPBOUND) 2.5 MG/0.5ML Subcutaneous Solution Auto-injector 2 mL 0     Sig: Inject 2.5 mg into the skin once a week.     This note was prepared using Dragon Medical voice recognition dictation software. As a result errors may occur. When identified these errors have been corrected. While every attempt is made to correct errors during dictation discrepancies may still exist          Imaging & Referrals:  None         [1]   Current Outpatient Medications   Medication Sig Dispense Refill    Tirzepatide-Weight Management (ZEPBOUND) 2.5 MG/0.5ML Subcutaneous Solution Auto-injector  Inject 2.5 mg into the skin once a week. 2 mL 0    rOPINIRole 0.25 MG Oral Tab Take 4 tablets (1 mg total) by mouth at bedtime. 90 tablet 1    ALPRAZolam 0.25 MG Oral Tab Take 1 tablet (0.25 mg total) by mouth 3 (three) times daily as needed. 30 tablet 1    clobetasol 0.05 % External Cream APPLY TO AFFECTED AREA EVERY DAY X1 MONTH AS NEEDED FOR FLARES      omeprazole 20 MG Oral Capsule Delayed Release Take one capsule (20 mg total) by mouth once daily, 30 minutes prior to breakfast. 90 capsule 3    MELOXICAM 15 MG Oral Tab TAKE 1 TABLET BY MOUTH EVERY DAY 90 tablet 0    Cholecalciferol (VITAMIN D) 2000 UNITS Oral Cap Take 1 capsule (2,000 Units total) by mouth daily.      Multiple Vitamin (MULTI-VITAMIN DAILY OR) Take 1 tablet by mouth daily.        Ascorbic Acid (VITAMIN C OR) Take 1 tablet by mouth daily.        Calcium Carbonate (CALCIUM 600 OR) Take 1 tablet by mouth daily.       [2]   Allergies  Allergen Reactions    Seasonal OTHER (SEE COMMENTS)     Runny nose, eyes    Dust Mites ITCHING

## 2025-05-13 NOTE — PATIENT INSTRUCTIONS
Thank you for choosing Adelfo Gonzales MD at [facility]  To Do: Gigi Dewitt  1. Please see below   Call [phone] to schedule the appointment.   Please signup for [employer], which is electronic access to your record if you have not done so.  All your results will post on there.  [email or web address]   You can NOW use [employer] to book your appointments with us, or consider using open access scheduling which is through the [place] website [email or web address] and type in Adelfo Gonzales MD and follow the links for \"Schedule Online Now\"    To schedule Imaging or tests at [facility] call Central Scheduling [phone], Go to [facility] A ER Building (For example: CT scans, X rays, Ultrasound, MRI)  Cardiac Testing in ER building Building A second floor Cardiac Testing [phone] (For example: Holter Monitor, Cardiac Stress tests,Event Monitor, or 2D Echocardiograms)  [facility] call [phone] usually in [facility]  Walk in Clinic in [place] at [address] Mon-Fri at 8am-7:30 p.m., and Sat/Sun 9:00a.m.-4:30 p.m.  Also at [address] [place] [address]  Call [phone] for info     Please call our office about any questions regarding your treatment/medicines/tests as a result of today's visit.  For your safety, read the entire package insert of all medicines prescribed to you and be aware of all of the risks of treatment even beyond those discussed today.  All therapies have potential risk of harm or side effects or medication interactions.  It is your duty and for your safety to discuss with the pharmacist and our office with questions, and to notify us and stop treatment if problems arise, but know that our intention is that the benefits outweigh those potential risks and we strive to make you healthier and to improve your quality of life.    Referrals, and Radiology Information:    If your insurance requires a referral to a specialist, please allow 5 business days to  process your referral request.    If Adelfo Gonzales MD orders a CT or MRI, it may take up to 10 business days to receive approval from your insurance company. Once our office has called informing you that the insurance company approved your testing, please call Central Scheduling at [phone]  Please allow our office 5 business days to contact you regarding any testing results.    Refill policies:   Allow 3 business days for refills; controlled substances may take longer and must be picked up from the office in person.  Narcotic medications can only be filled in 30 day increments and must be refilled at an office visit only.  If your prescription is due for a refill, you may be due for a follow-up appointment.  We cannot refill your maintenance medications at a preventative wellness visit.  To best provide you care, patients receiving maintenance medications need to be seen at least twice a year.

## 2025-05-30 NOTE — TELEPHONE ENCOUNTER
Key: [id number]    PA submitted through [facility], awaiting determination.

## 2025-07-07 NOTE — TELEPHONE ENCOUNTER
Received refill request from [facility] in [place] for a 90 day prescription for the Ropinirole HCL 0.25 mg tablet.

## 2025-07-08 RX ORDER — ROPINIROLE 0.25 MG/1
1 TABLET, FILM COATED ORAL NIGHTLY
Qty: 360 TABLET | Refills: 0 | Status: SHIPPED | OUTPATIENT
Start: 2025-07-08

## 2025-07-08 NOTE — TELEPHONE ENCOUNTER
Requesting Ropinirole 0.25mg  LOV: 5/13/25  RTC: 4 weeks  Last Relevant Labs: 4/30/25  Filled: 5/1/25 #90 with 1 refills    Future Appointments   Date Time Provider Department Center   8/25/2025  7:00 AM Tegan Harris DO [facility] SUB GI     Neurology Medications [id number]/2025 02:05 PM   Protocol Details   In person appointment or virtual visit in the past 6 mos or appointment in next 3 mos    Medication is active on med list     Rx sent to pharmacy per protocol

## 2025-07-11 DIAGNOSIS — F41.9 ANXIETY: ICD-10-CM

## 2025-07-11 RX ORDER — ALPRAZOLAM 0.25 MG
0.25 TABLET ORAL 3 TIMES DAILY PRN
Qty: 30 TABLET | Refills: 1 | Status: SHIPPED | OUTPATIENT
Start: 2025-07-11

## 2025-07-11 NOTE — TELEPHONE ENCOUNTER
Requesting Alprazolam 0.25mg  Last OV: 5/13/25  RTC: 4 weeks  Last Rx'd 11/6/24 #30 with 1 refill    Future Appointments   Date Time Provider Department Center   8/25/2025  7:00 AM Tegan Harris, DO [facility] SUB GI       Per [place] , last dispensed 3/9/25 #30    Controlled med:  Rx pended and routed for approval/denial

## (undated) DEVICE — STERILE POLYISOPRENE POWDER-FREE SURGICAL GLOVES: Brand: PROTEXIS

## (undated) DEVICE — SOLUTION  .9 1000ML BTL

## (undated) DEVICE — Device

## (undated) DEVICE — C-ARM: Brand: UNBRANDED

## (undated) DEVICE — SHEET,DRAPE,40X58,STERILE: Brand: MEDLINE

## (undated) DEVICE — ENDOPATH 5MM CURVED SCISSORS WITH MONOPOLAR CAUTERY: Brand: ENDOPATH

## (undated) DEVICE — ENDOPATH ULTRA VERESS INSUFFLATION NEEDLES WITH LUER LOCK CONNECTORS: Brand: ENDOPATH

## (undated) DEVICE — TROCAR: Brand: KII SHIELDED BLADED ACCESS SYSTEM

## (undated) DEVICE — DISPOSABLE LAPAROSCOPIC CLIP APPLIER WITH 20 CLIPS.: Brand: EPIX® UNIVERSAL CLIP APPLIER

## (undated) DEVICE — LAP CHOLE/APPY CDS-LF: Brand: MEDLINE INDUSTRIES, INC.

## (undated) DEVICE — SUT VICRYL 0 UR-6 J603H

## (undated) DEVICE — 40580 - THE PINK PAD - ADVANCED TRENDELENBURG POSITIONING KIT: Brand: 40580 - THE PINK PAD - ADVANCED TRENDELENBURG POSITIONING KIT

## (undated) DEVICE — POUCH SPECIMEN WIRE 6X3 250ML

## (undated) DEVICE — SLEEVE KENDALL SCD EXPRESS MED

## (undated) DEVICE — #11 STERILE BLADE: Brand: POLYMER COATED BLADES

## (undated) DEVICE — SUT VICRYL 0 J608H

## (undated) DEVICE — PTFE COATED BLADE 2.75': Brand: MEDLINE

## (undated) DEVICE — TIGERTAIL 5F FLXTIP 70CM

## (undated) DEVICE — LIGHT HANDLE

## (undated) DEVICE — APPLICATOR CHLORAPREP 26ML

## (undated) DEVICE — UNDYED BRAIDED (POLYGLACTIN 910), SYNTHETIC ABSORBABLE SUTURE: Brand: COATED VICRYL

## (undated) DEVICE — BNDG ADH W1INXL3IN NAT FAB N

## (undated) DEVICE — ZIPWIRE GUIDEWIRE .035X150 STR

## (undated) DEVICE — SYRINGE 10ML LL TIP

## (undated) DEVICE — TROCAR: Brand: KII® SLEEVE

## (undated) NOTE — LETTER
[facility] [address], [address] [place] [address]    Consent for Anesthesia   1.    IMartín agree to be cared for by an anesthesiologist, who is specially trained to monitor me and give me medicine to put me to sleep or keep me comf vision, nerves, or muscles and in extremely rare instances death. 5. My doctor has explained to me other choices available to me for my care (alternatives).   6. Pregnant Patients (“epidural”):  I understand that the risks of having an epidural (medicine g

## (undated) NOTE — MR AVS SNAPSHOT
After Visit Summary   2/14/2017    Kashmir Watkins    MRN: [id number]           Diagnoses this Visit     Senile osteoporosis    -  Primary       Allergies     No Known Allergies      Your Vital Signs Were     Smoking Status                   For

## (undated) NOTE — LETTER
11/15/22    Patient: Araceli Cobos  : 5/3/1950 Visit date: 11/15/2022    Dear  Kathie Smith MD    Thank you for referring Araceli Cobos to my practice. Please find my assessment and plan below. History of Present Illness    Patient is status post laparoscopic cholecystectomy with intra-operative cholangiogram.  They are here today for a follow up visit. Assessment   Calculus of gallbladder with acute on chronic cholecystitis without obstruction  (primary encounter diagnosis)  S/P laparoscopic cholecystectomy    The patient is status post laparoscopic cholecystectomy  The pt is doing very well after surgery. Tolerating a diet without recurrence of their symptoms. The patient denies any post-operative diarrhea but is experiencing some fecal urgency. This is expected to resolve spontaneously. .   The patient has returned to the majority of their daily activities without difficulty. The patient required 1 tablet of Norco postoperatively. They no longer require pain medication for pain control. They have resumed their preoperative medications. The pathology was consistent with chronic cholecystitis, 2 mm detached benign polypoid change. .  The patient does not have any questions. Plan   Patient should follow up with me should any issues arise, otherwise they will see me as needed and return to their PCP for routine care.        Sincerely,       Amanda Bellamy MD   CC: No Recipients

## (undated) NOTE — MR AVS SNAPSHOT
[facility] [address] [place] [id number] [phone]               Thank you for choosing us for your health care visit with Benoit Owne DO.   We are glad to serve you and happy to provide you wi your procedure except for your usual medication with a small amount of water. The estimated duration of your visit could take 6 hours, which includes recovery time after the procedure.             Aug 10, 2017 11:30 AM   FOLLOW UP with Russel Ruiz MD   D A healthcare provider takes a sample of your stool and checks for Salmonella. More than one stool sample may be needed. Treating Salmonella infection  Most otherwise healthy people get better within 5 to 7 days.  You will usually not need antibiotics (drug 83125. All rights reserved. This information is not intended as a substitute for professional medical care. Always follow your healthcare professional's instructions.              Allergies as of Jun 19, 2017     No Known Allergies                Today's Vi

## (undated) NOTE — ED AVS SNAPSHOT
[facility] in [address] [place] [address]  Phone:  [phone]  Fax:  [phone]          Kay Saldivar   MRN: [id number]    Department:  [facility] in [place]   Date of Visit:  6/25 IF THERE IS ANY CHANGE OR WORSENING OF YOUR CONDITION, CALL YOUR PRIMARY CARE PHYSICIAN AT ONCE OR RETURN IMMEDIATELY TO THE EMERGENCY DEPARTMENT.     If you have been prescribed any medication(s), please fill your prescription right away and begin taking t

## (undated) NOTE — LETTER
10/10/18        Jess [address] [address]      Dear Noemy Harrison,    Our records indicate that you have outstanding lab work and or testing that was ordered for you and has not yet been completed:  Orders Placed This Encounter